# Patient Record
Sex: MALE | Race: BLACK OR AFRICAN AMERICAN | NOT HISPANIC OR LATINO | URBAN - METROPOLITAN AREA
[De-identification: names, ages, dates, MRNs, and addresses within clinical notes are randomized per-mention and may not be internally consistent; named-entity substitution may affect disease eponyms.]

---

## 2021-07-04 ENCOUNTER — INPATIENT (INPATIENT)
Facility: HOSPITAL | Age: 26
LOS: 15 days | Discharge: HOME | End: 2021-07-20
Attending: PSYCHIATRY & NEUROLOGY | Admitting: PSYCHIATRY & NEUROLOGY
Payer: MEDICAID

## 2021-07-04 VITALS
TEMPERATURE: 100 F | HEART RATE: 104 BPM | OXYGEN SATURATION: 99 % | DIASTOLIC BLOOD PRESSURE: 81 MMHG | RESPIRATION RATE: 16 BRPM | SYSTOLIC BLOOD PRESSURE: 145 MMHG

## 2021-07-04 DIAGNOSIS — F29 UNSPECIFIED PSYCHOSIS NOT DUE TO A SUBSTANCE OR KNOWN PHYSIOLOGICAL CONDITION: ICD-10-CM

## 2021-07-04 LAB
ALBUMIN SERPL ELPH-MCNC: 4.7 G/DL — SIGNIFICANT CHANGE UP (ref 3.3–5)
ALP SERPL-CCNC: 95 U/L — SIGNIFICANT CHANGE UP (ref 40–120)
ALT FLD-CCNC: 16 U/L — SIGNIFICANT CHANGE UP (ref 4–41)
ANION GAP SERPL CALC-SCNC: 14 MMOL/L — SIGNIFICANT CHANGE UP (ref 7–14)
APPEARANCE UR: CLEAR — SIGNIFICANT CHANGE UP
AST SERPL-CCNC: 17 U/L — SIGNIFICANT CHANGE UP (ref 4–40)
BASOPHILS # BLD AUTO: 0.05 K/UL — SIGNIFICANT CHANGE UP (ref 0–0.2)
BASOPHILS NFR BLD AUTO: 0.9 % — SIGNIFICANT CHANGE UP (ref 0–2)
BILIRUB SERPL-MCNC: 0.4 MG/DL — SIGNIFICANT CHANGE UP (ref 0.2–1.2)
BILIRUB UR-MCNC: NEGATIVE — SIGNIFICANT CHANGE UP
BUN SERPL-MCNC: 12 MG/DL — SIGNIFICANT CHANGE UP (ref 7–23)
CALCIUM SERPL-MCNC: 9.9 MG/DL — SIGNIFICANT CHANGE UP (ref 8.4–10.5)
CHLORIDE SERPL-SCNC: 103 MMOL/L — SIGNIFICANT CHANGE UP (ref 98–107)
CO2 SERPL-SCNC: 23 MMOL/L — SIGNIFICANT CHANGE UP (ref 22–31)
COLOR SPEC: SIGNIFICANT CHANGE UP
CREAT SERPL-MCNC: 1.22 MG/DL — SIGNIFICANT CHANGE UP (ref 0.5–1.3)
DIFF PNL FLD: NEGATIVE — SIGNIFICANT CHANGE UP
EOSINOPHIL # BLD AUTO: 0.28 K/UL — SIGNIFICANT CHANGE UP (ref 0–0.5)
EOSINOPHIL NFR BLD AUTO: 5.2 % — SIGNIFICANT CHANGE UP (ref 0–6)
GLUCOSE SERPL-MCNC: 79 MG/DL — SIGNIFICANT CHANGE UP (ref 70–99)
GLUCOSE UR QL: NEGATIVE — SIGNIFICANT CHANGE UP
HCT VFR BLD CALC: 42.5 % — SIGNIFICANT CHANGE UP (ref 39–50)
HGB BLD-MCNC: 14 G/DL — SIGNIFICANT CHANGE UP (ref 13–17)
IANC: 2.84 K/UL — SIGNIFICANT CHANGE UP (ref 1.5–8.5)
IMM GRANULOCYTES NFR BLD AUTO: 0.2 % — SIGNIFICANT CHANGE UP (ref 0–1.5)
KETONES UR-MCNC: NEGATIVE — SIGNIFICANT CHANGE UP
LEUKOCYTE ESTERASE UR-ACNC: NEGATIVE — SIGNIFICANT CHANGE UP
LYMPHOCYTES # BLD AUTO: 1.55 K/UL — SIGNIFICANT CHANGE UP (ref 1–3.3)
LYMPHOCYTES # BLD AUTO: 28.7 % — SIGNIFICANT CHANGE UP (ref 13–44)
MCHC RBC-ENTMCNC: 29.9 PG — SIGNIFICANT CHANGE UP (ref 27–34)
MCHC RBC-ENTMCNC: 32.9 GM/DL — SIGNIFICANT CHANGE UP (ref 32–36)
MCV RBC AUTO: 90.6 FL — SIGNIFICANT CHANGE UP (ref 80–100)
MONOCYTES # BLD AUTO: 0.67 K/UL — SIGNIFICANT CHANGE UP (ref 0–0.9)
MONOCYTES NFR BLD AUTO: 12.4 % — SIGNIFICANT CHANGE UP (ref 2–14)
NEUTROPHILS # BLD AUTO: 2.84 K/UL — SIGNIFICANT CHANGE UP (ref 1.8–7.4)
NEUTROPHILS NFR BLD AUTO: 52.6 % — SIGNIFICANT CHANGE UP (ref 43–77)
NITRITE UR-MCNC: NEGATIVE — SIGNIFICANT CHANGE UP
NRBC # BLD: 0 /100 WBCS — SIGNIFICANT CHANGE UP
NRBC # FLD: 0 K/UL — SIGNIFICANT CHANGE UP
PCP SPEC-MCNC: SIGNIFICANT CHANGE UP
PH UR: 6 — SIGNIFICANT CHANGE UP (ref 5–8)
PLATELET # BLD AUTO: 310 K/UL — SIGNIFICANT CHANGE UP (ref 150–400)
POTASSIUM SERPL-MCNC: 4.8 MMOL/L — SIGNIFICANT CHANGE UP (ref 3.5–5.3)
POTASSIUM SERPL-SCNC: 4.8 MMOL/L — SIGNIFICANT CHANGE UP (ref 3.5–5.3)
PROT SERPL-MCNC: 7.9 G/DL — SIGNIFICANT CHANGE UP (ref 6–8.3)
PROT UR-MCNC: NEGATIVE — SIGNIFICANT CHANGE UP
RBC # BLD: 4.69 M/UL — SIGNIFICANT CHANGE UP (ref 4.2–5.8)
RBC # FLD: 13.5 % — SIGNIFICANT CHANGE UP (ref 10.3–14.5)
SODIUM SERPL-SCNC: 140 MMOL/L — SIGNIFICANT CHANGE UP (ref 135–145)
SP GR SPEC: 1.01 — SIGNIFICANT CHANGE UP (ref 1.01–1.02)
TOXICOLOGY SCREEN, DRUGS OF ABUSE, SERUM RESULT: SIGNIFICANT CHANGE UP
TSH SERPL-MCNC: 1.01 UIU/ML — SIGNIFICANT CHANGE UP (ref 0.27–4.2)
UROBILINOGEN FLD QL: SIGNIFICANT CHANGE UP
WBC # BLD: 5.4 K/UL — SIGNIFICANT CHANGE UP (ref 3.8–10.5)
WBC # FLD AUTO: 5.4 K/UL — SIGNIFICANT CHANGE UP (ref 3.8–10.5)

## 2021-07-04 PROCEDURE — 93010 ELECTROCARDIOGRAM REPORT: CPT

## 2021-07-04 PROCEDURE — 99285 EMERGENCY DEPT VISIT HI MDM: CPT | Mod: 25

## 2021-07-04 RX ORDER — DIPHENHYDRAMINE HCL 50 MG
50 CAPSULE ORAL EVERY 6 HOURS
Refills: 0 | Status: DISCONTINUED | OUTPATIENT
Start: 2021-07-05 | End: 2021-07-20

## 2021-07-04 RX ORDER — HALOPERIDOL DECANOATE 100 MG/ML
5 INJECTION INTRAMUSCULAR EVERY 6 HOURS
Refills: 0 | Status: DISCONTINUED | OUTPATIENT
Start: 2021-07-05 | End: 2021-07-20

## 2021-07-04 RX ORDER — RISPERIDONE 4 MG/1
1 TABLET ORAL ONCE
Refills: 0 | Status: DISCONTINUED | OUTPATIENT
Start: 2021-07-04 | End: 2021-07-04

## 2021-07-04 RX ORDER — RISPERIDONE 4 MG/1
1 TABLET ORAL AT BEDTIME
Refills: 0 | Status: DISCONTINUED | OUTPATIENT
Start: 2021-07-05 | End: 2021-07-06

## 2021-07-04 RX ORDER — RISPERIDONE 4 MG/1
2 TABLET ORAL ONCE
Refills: 0 | Status: COMPLETED | OUTPATIENT
Start: 2021-07-04 | End: 2021-07-04

## 2021-07-04 RX ADMIN — RISPERIDONE 2 MILLIGRAM(S): 4 TABLET ORAL at 23:07

## 2021-07-04 NOTE — ED BEHAVIORAL HEALTH ASSESSMENT NOTE - NS ED [facility] ED COURSE FOUR POINT RESTRAINTS IN ED YN
I will STOP taking the medications listed below when I get home from the hospital:    furosemide 20 mg oral tablet  -- 1 tab(s) by mouth once a day
No

## 2021-07-04 NOTE — ED BEHAVIORAL HEALTH ASSESSMENT NOTE - OTHER PAST PSYCHIATRIC HISTORY (INCLUDE DETAILS REGARDING ONSET, COURSE OF ILLNESS, INPATIENT/OUTPATIENT TREATMENT)
Per pt, patient has history of primary psychotic disorder, schizophrenia vs schizoaffective disorder. 1st inpatient hospitalization in 2017. 2 prior inpatient hospitalizations. Last in 5/12-25/2021 at [facility],

## 2021-07-04 NOTE — ED BEHAVIORAL HEALTH NOTE - BEHAVIORAL HEALTH NOTE
COVID Exposure Screen- Patient    1. *Have you had a COVID-19 test in the last 90 days? ( ) Yes (X) No ( ) Unknown- Reason: _____    IF YES PROCEED TO QUESTION #2. IF NO OR UNKNOWN, PLEASE SKIP TO QUESTION #3.    2. Date of test(s) and result(s): ________    3. *Have you tested positive for COVID-19 antibodies? ( ) Yes (X) No ( ) Unknown- Reason: _____    IF YES PROCEED TO QUESTION #4. IF NO or UNKNOWN, PLEASE SKIP TO QUESTION #5.    4. Date of positive antibody test: ________    5. *Have you received 2 doses of the COVID-19 vaccine? ( X) Yes ( ) No ( ) Unknown- Reason: _____    IF YES PROCEED TO QUESTION #6. IF NO or UNKNOWN, PLEASE SKIP TO QUESTION #7.    6. Date of second dose: 6/7/21    7. *In the past 10 days, have you been around anyone with a positive COVID-19 test?* ( ) Yes ( ) No (X ) Unknown- Reason: ____    IF YES PROCEED TO QUESTION #8. IF NO or UNKNOWN, PLEASE SKIP TO QUESTION #13.    8. Were you within 6 feet of them for at least 15 minutes? ( ) Yes ( ) No ( ) Unknown- Reason: _____    9. Have you provided care for them? ( ) Yes ( ) No ( ) Unknown- Reason: ______    10. Have you had direct physical contact with them (touched, hugged, or kissed them)? ( ) Yes ( ) No ( ) Unknown- Reason: _____    11. Have you shared eating or drinking utensils with them? ( ) Yes ( ) No ( ) Unknown- Reason: ____    12. Have they sneezed, coughed, or somehow gotten respiratory droplets on you? ( ) Yes ( ) No ( ) Unknown- Reason: ______    13. *Have you been out of [place] within the past 10 days?* ( ) Yes (X ) No ( ) Unknown- Reason: _____    IF YES PLEASE ANSWER THE FOLLOWING QUESTIONS:    14. Which state/country have you been to? ______    15. Were you there over 24 hours? ( ) Yes ( ) No ( ) Unknown- Reason: ______    16. Date of return to [facility]: ______

## 2021-07-04 NOTE — ED BEHAVIORAL HEALTH ASSESSMENT NOTE - VIOLENCE PROTECTIVE FACTORS:
Residential stability/Employment stability/Insight into violence risk and need for management/treatment

## 2021-07-04 NOTE — ED BEHAVIORAL HEALTH ASSESSMENT NOTE - RISK ASSESSMENT
High Acute Suicide Risk The patient is at chronically elevated risk of self harm and suicide. Risk factors include recent passive SI, psychosis, history of treatment nonadherence, hx of inpatient hospitalizations. Protective factors include no active SI/I/P on unit, stable housing, supportive housing. Overall, the patient is an acute and imminent risk of harm and meets criteria for psychiatric hospitalization for safety and stabilization. The patient is at chronically elevated risk of self harm and suicide. Risk factors include recent passive SI, psychosis, history of treatment nonadherence, hx of inpatient hospitalizations. Protective factors include no active SI/I/P on unit pt help seeking,, stable housing, supportive housing. Overall, the patient is an acute and imminent risk of harm and meets criteria for psychiatric hospitalization for safety and stabilization. Risk will be mitigated by admission to psychiatric hospital.
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
## 2021-07-04 NOTE — ED BEHAVIORAL HEALTH ASSESSMENT NOTE - DESCRIPTION
Calm and cooperative in ED. Calm and cooperative in ED.    Vital Signs Last 24 Hrs  T(C): 37.5 (04 Jul 2021 19:22), Max: 37.5 (04 Jul 2021 19:22)  T(F): 99.5 (04 Jul 2021 19:22), Max: 99.5 (04 Jul 2021 19:22)  HR: 104 (04 Jul 2021 19:22) (104 - 104)  BP: 145/81 (04 Jul 2021 19:22) (145/81 - 145/81)  BP(mean): --  RR: 16 (04 Jul 2021 19:22) (16 - 16)  SpO2: 99% (04 Jul 2021 19:22) (99% - 99%) Denies medical history, NKDA Lives w/ parents in [place]. Working as a temp.

## 2021-07-04 NOTE — ED BEHAVIORAL HEALTH ASSESSMENT NOTE - PSYCHIATRIC ISSUES AND PLAN (INCLUDE STANDING AND PRN MEDICATION)
PRNs for agitation: Haldol 5 mg PO/IM q6h, Ativan 2 mg PO/IM q6h, Benadryl 50 mg PO/IM q6h Start Risperdal 2 mg PO qhs for psychosis and Ativan 1 mg qhs PO for insomnia. PRNs for agitation: Haldol 5 mg PO/IM q6h, Ativan 2 mg PO/IM q6h, Benadryl 50 mg PO/IM q6h Start Risperdal 1 mg PO qhs for psychosis and Ativan 1 mg qhs PO for insomnia. PRNs for agitation: Haldol 5 mg PO/IM q6h, Ativan 2 mg PO/IM q6h, Benadryl 50 mg PO/IM q6h Pt received Risperdal 2mg in the ED, primary team to determine FORD dosage which was due 2 weeks ago, would be beneficial to discuss alternative options with patient due to concerns re: weight gain, PRNS: Ativan 1 mg qhs PO for insomnia. PRNs for agitation: Haldol 5 mg PO/IM q6h, Ativan 2 mg PO/IM q6h, Benadryl 50 mg PO/IM q6h

## 2021-07-04 NOTE — ED PROVIDER NOTE - PROGRESS NOTE DETAILS
Chandrakant ERAZO: Pt signed out to me.  Labs reviewed, covid pcr neg.  Pt is medically clear for psychiatric dispo.  Will be admitted to [facility].

## 2021-07-04 NOTE — ED BEHAVIORAL HEALTH ASSESSMENT NOTE - DIFFERENTIAL
Primary psychotic disorder, schizophrenia vs schizoaffective disorder vs substance induced psychosis
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
## 2021-07-04 NOTE — ED PROVIDER NOTE - OBJECTIVE STATEMENT
26 y/o  M hx Schizophrenia   BIBA secondary to  bizarre behaviour and suicidal ideations .  Admits to death as his final answer to getting to sleep and peace.    Appears paranoid and internally preoccupied. Denies HI/AH/VH.  No evidence of physical injuries, broken  skin or deformities.  Denies falling, punching or kicking any objects. Denies pain, SOB, fever , chills, chest/ abdominal discomfort.  Denies recent use of alcohol or other   illicit drugs. Admits to medication non compliance.

## 2021-07-04 NOTE — ED ADULT NURSE NOTE - NS ED NURSE DISCH DISPOSITION
[facility]    [address] [address]    Phone:  [phone]                                       Lita Ocasio   MRN: [id number]    Department:  [facility]   Date of Visit:  5/26/2018           After Visit Summary Signature Page     I have received my discharge instructions, and my questions have been answered. I have discussed any challenges I see with this plan with the nurse or doctor.    ..........................................................................................................................................  Patient/Patient Representative Signature      ..........................................................................................................................................  Patient Representative Print Name and Relationship to Patient    ..................................................               ................................................  Date                                            Time    ..........................................................................................................................................  Reviewed by Signature/Title    ...................................................              ..............................................  Date                                                            Time           Admitted

## 2021-07-04 NOTE — ED PROVIDER NOTE - PATIENT PORTAL LINK FT
You can access the FollowMyHealth Patient Portal offered by [facility] by registering at the following website: [email or web address]. By joining [facility]’s FollowMyHealth portal, you will also be able to view your health information using other applications (apps) compatible with our system.

## 2021-07-04 NOTE — ED ADULT NURSE NOTE - OBJECTIVE STATEMENT
break RN: pt received in  area. c/o inability to sleep. reports has intermittent auditory hallucinations but denies at present. denies any si at present. states "sometimes I just wanna die cause I cant sleep." denies hi. denies medical complaints. reports drank a 40 ounce beer today. reports rarely drinks and does not use drugs. denies any other substance use. calm and cooperative. last invega injection was at the end of may.

## 2021-07-04 NOTE — ED BEHAVIORAL HEALTH ASSESSMENT NOTE - CURRENT MEDICATION
Reportedly last received Invega FORD on 5/25/21 Reportedly last received Invega FORD on 5/25/21 at [facility].
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
## 2021-07-04 NOTE — ED PROVIDER NOTE - CLINICAL SUMMARY MEDICAL DECISION MAKING FREE TEXT BOX
26 y/o  M hx Schizophrenia    Labs, Urine Tox/UA, EKG  Medical evaluation performed. There is no clinical evidence of intoxication or any acute medical problem requiring immediate intervention. Patient is awaiting psychiatric consultation. Final disposition will be determined by psychiatrist.

## 2021-07-04 NOTE — ED BEHAVIORAL HEALTH ASSESSMENT NOTE - PAST PSYCHOTROPIC MEDICATION
Prior trial of Risperdal. No information found on I-STOP. Prior trial of Risperdal. Unsure of other medication trials. No information found on I-STOP.

## 2021-07-04 NOTE — ED BEHAVIORAL HEALTH ASSESSMENT NOTE - HPI (INCLUDE ILLNESS QUALITY, SEVERITY, DURATION, TIMING, CONTEXT, MODIFYING FACTORS, ASSOCIATED SIGNS AND SYMPTOMS)
24 y/o man, domiciled w/ parents and older sister in [facility], working a temp job in , single, no-dependants, no pertinent PMH, NKDA, denies any SA or self-injurious behavior, history of aggression toward father, 2 prior inpatient psych admissions, 1st in 2017, last in 5/12-25/2021 at [facility], reports active cannabis use (CBD gummies), last had Invega FORD on 5/25/2021. 26 y/o male, domiciled w/ parents and older sister in [facility], working a temp job in , single, no-dependants, reported history of schizophrenia vs schizoaffective disorder bipolar type, no pertinent PMH, NKDA, denies any SA or self-injurious behavior, history of aggression toward father, 2 prior inpatient psych admissions, 1st in 2017, last in 5/12-25/2021 at [facility], reports active cannabis use (CBD gummies), reportedly last had Invega FORD on 5/25/2021, who was brought to the ED via EMS activated by patient who called 911 due to suicidal ideation in setting of missing dose of FORD.     On interview, pt was calm and cooperative though overtly thought disordered. Pt reports that he has been having difficulty sleeping, his mind is "racing," he was Tweeting profusely, he has been feeling depressed because he has not found his "Gladys" (he has been single for 5 years). He states that he wants to find a "wife" that cooks and wishes that he could be sexually active (but reports that it is unlikely to find someone who "understands" him). He states that he called 911 because he was thinking about "ending it all" and was thinking about ending his life. Reports thoughts of wishing he was dead currently, but denies intent or plan. He denies any prior history of SA. He believes it is possible that he has bipolar disorder and that he previously was on Risperdal but has been on Invega for the last 2 years, which he does not believe he needs. He reports history of AVH which he perceives as "loud music." He reports using cannabis products regularly, which he states that makes the music "louder" stated, "weed is loud." Reports taking edible today and drinking 1 beer earlier today, denies any other substance use. 26 y/o male, domiciled w/ parents and older sister in [facility], working a temp job in , single, no-dependants, reported history of schizophrenia vs schizoaffective disorder bipolar type, no pertinent PMH, NKDA, denies any SA or self-injurious behavior, history of aggression toward father, 2 prior inpatient psych admissions, 1st in 2017, last in 5/12-25/2021 at [facility], reports active cannabis use (CBD gummies), reportedly last had Invega FORD on 5/25/2021, who was brought to the ED via EMS activated by patient who called 911 due to suicidal ideation in setting of missing dose of FORD.     On interview, pt was calm and cooperative though overtly thought disordered. Pt reports that he has been having difficulty sleeping, his mind is "racing," he was Tweeting profusely, he has been feeling depressed because he has not found his "Gladys" (he has been single for 5 years). He states that he wants to find a "wife" that cooks and wishes that he could be sexually active (but reports that it is unlikely to find someone who "understands" him). He states that he called 911 because he was thinking about "ending it all" and was thinking about ending his life. Reports thoughts of wishing he was dead currently, but denies intent or plan. He denies any prior history of SA. He believes it is possible that he has bipolar disorder and that he previously was on Risperdal but has been on Invega for the last 2 years, which he does not believe he needs. He reports history of AVH which he perceives as "loud music." He reports using cannabis products regularly, which he states that makes the music "louder" stated, "weed is loud." Reports taking edible today and drinking 1 beer earlier today, denies any other substance use.    Attempted to call patient, left a secure voicemail. 24 y/o male, domiciled w/ parents and sister in [facility], previously working a temp job in , single, no dependants, reported history of schizophrenia vs schizoaffective disorder bipolar type, no pertinent PMH, tested COVID-19 + in May 2021 (completed Moderna series last month) NKDA, denies any SA or self-injurious behavior, family denies history of violence or aggression, 2 prior inpatient psych admissions, 1st in 2017, last in 5/12-25/2021 at [facility], in outpatient psychiatric tx at [facility], reports active cannabis use (CBD gummies), reportedly last had Invega FORD on 5/25/2021, who was brought to the ED via EMS activated by patient who called 911 due to suicidal ideation in setting of missing dose of FORD.     On interview, pt was calm and cooperative though overtly thought disordered. Pt reports that he has been having difficulty sleeping, his mind is "racing," he was Tweeting profusely, he has been feeling depressed because he has not found his "Gladys" (he has been single for 5 years). He states that he wants to find a "wife" that cooks and wishes that he could be sexually active (but reports that it is unlikely to find someone who "understands" him). He states that he called 911 because he was thinking about "ending it all" and was thinking about ending his life. Reports thoughts of wishing he was dead currently, but denies intent or plan. He denies any prior history of SA. He believes it is possible that he has bipolar disorder and that he previously was on Risperdal but has been on Invega for the last 2 years, which he does not believe he needs. He reports history of AVH which he perceives as "loud music." He reports using cannabis products regularly, which he states that makes the music "louder" stated, "weed is loud." Reports taking edible today and drinking 1 beer earlier today, denies any other substance use.    Collateral information obtained from pt's mother Ronna Johnson ([phone]). Report that pt has a history of bipolar disorder and schizophrenia and receives his outpt psychiatric treatment at [facility] but recently changed psychiatrists. He had gone to [place] today and family found it strange he told them he was going to go to [place] later to watch the 4th of July fireworks. They were unaware pt was in the ED, but noted that pt calls the ED when he is "distressed." Pt has a history of medication nonadherence, he had been off of his FORD since Sept 2020 until May 2021. Pt had been doing "okay" since his last inpatient psychiatric hospitalization, had lost weight and had been exercising. Report pt does not have a history of SI or SA, no history of aggression or violence. Updated family of treatment plan. 26 y/o male, domiciled w/ parents and sister in [facility], previously working a temp job in , single, no dependants, reported history of schizophrenia vs schizoaffective disorder bipolar type, no pertinent PMH, tested COVID-19 + in May 2021 (completed Moderna series last month) NKDA, denies any SA or self-injurious behavior, family denies history of violence or aggression, 2 prior inpatient psych admissions, 1st in 2017, last in 5/12-25/2021 at [facility], in outpatient psychiatric tx at [facility], denies substance use (but reports eating CBD gummies)  reportedly last had Invega FORD on 5/25/2021, who was brought to the ED via EMS activated by patient who called 911 due to suicidal ideation in setting of missing dose of FORD.     On interview, pt was calm and cooperative though overtly thought disordered. Pt reports that he has been having difficulty sleeping, his mind is "racing," he was Tweeting profusely, he has been feeling depressed because he has not found his "Gladys" (he has been single for 5 years). He states that he is "paleosexual" which he defines as an intellectual sexual person. He states that he wants to find a "wife" that cooks and wishes that he could be sexually active (but reports that it is unlikely to find someone who "understands" him). He states that he called 911 because he was thinking about "ending it all" and was thinking about ending his life. Reports thoughts of wishing he was dead currently, but denies intent or plan (he identified suicidal ideation as "feeling light blue." He denies any prior history of SA. He believes it is possible that he has bipolar disorder and that he previously was on Risperdal but has been on Invega for the last 2 years, which he does not believe he needs. He reports history of AVH which he perceives as "loud music" and "notes." He states that this is keeping him up at night.  He reports using CBD products regularly, which he states that makes the music "louder" stated, "weed is loud." He denies using substances (other than 1 beer earlier today) but states that he feels "[place]" from 2nd hand smoke is causing his voices. Of note, patient states that he at times stops taking his Invega because he is nervous about weight gain. (He previously weight 370 pounds and lost over 100 pounds. HE stated that he was able to lose weight on both Invega and off of Invega, but that he stops this medication due to concern for weight gain. He states that he would be open to trying new medication or changing the formulation of Invega to Trinza, though he feels that his Invega at times doesn't "hold" him, though he missed his dose 2 weeks ago.     Collateral information obtained from pt's mother Ronna Johnson ([phone]). Report that pt has a history of bipolar disorder and schizophrenia and receives his outpt psychiatric treatment at [facility] but recently changed psychiatrists. He had gone to [place] today and family found it strange he told them he was going to go to [place] later to watch the 4th of July [employer]. They were unaware pt was in the ED, but noted that pt calls the ED when he is "distressed." Pt has a history of medication nonadherence, he had been off of his FORD since Sept 2020 until May 2021. Pt had been doing "okay" since his last inpatient psychiatric hospitalization, had lost weight and had been exercising. Report pt does not have a history of SI or SA, no history of aggression or violence. Updated family of treatment plan.

## 2021-07-04 NOTE — ED ADULT TRIAGE NOTE - CHIEF COMPLAINT QUOTE
Pt PMH of schizophrenia, non compliant with his medications x1 week c/o hallucinations and suicidal ideation without plan. Denies ETOH/ drug use/ HI.

## 2021-07-04 NOTE — ED ADULT NURSE NOTE - NSIMPLEMENTINTERV_GEN_ALL_ED
Implemented All Universal Safety Interventions:  [place] to call system. Call bell, personal items and telephone within reach. Instruct patient to call for assistance. Room bathroom lighting operational. Non-slip footwear when patient is off stretcher. Physically safe environment: no spills, clutter or unnecessary equipment. Stretcher in lowest position, wheels locked, appropriate side rails in place.

## 2021-07-04 NOTE — ED BEHAVIORAL HEALTH ASSESSMENT NOTE - CASE SUMMARY
I have evaluated the patient and I discussed the case with Dr. Larkin. I agree with her assessment and plan. In brief, the patient is a 25 year old man with history of schizophrenia and past psychiatric hospitalizations. He is on Invega Sustenna, but missed his most recent dose (due end of June). He reports distressing AH (or music) that is interfering with his sleep and his functioning. He also expresses intermittent SI, without intent or plan.  The patient is acutely psychotic with AH, he appears distracted and likely is responding to internal stimuli which is also interfering with his sleep and functioning. He would benefit from inpatient hospitalization for safety and stabilization given suicidal ideation/psychosis and will be admitted on 9.39. Family and patient aware and agreeable with plan. Patient will go to [facility] once medically cleared.

## 2021-07-05 DIAGNOSIS — F29 UNSPECIFIED PSYCHOSIS NOT DUE TO A SUBSTANCE OR KNOWN PHYSIOLOGICAL CONDITION: ICD-10-CM

## 2021-07-05 LAB
COVID-19 SPIKE DOMAIN AB INTERP: POSITIVE
COVID-19 SPIKE DOMAIN ANTIBODY RESULT: >250 U/ML — HIGH
SARS-COV-2 IGG+IGM SERPL QL IA: >250 U/ML — HIGH
SARS-COV-2 IGG+IGM SERPL QL IA: POSITIVE
SARS-COV-2 RNA SPEC QL NAA+PROBE: SIGNIFICANT CHANGE UP

## 2021-07-05 PROCEDURE — 99222 1ST HOSP IP/OBS MODERATE 55: CPT

## 2021-07-05 RX ORDER — TRAZODONE HCL 50 MG
50 TABLET ORAL AT BEDTIME
Refills: 0 | Status: DISCONTINUED | OUTPATIENT
Start: 2021-07-05 | End: 2021-07-06

## 2021-07-05 RX ORDER — HYDROXYZINE HCL 10 MG
50 TABLET ORAL EVERY 6 HOURS
Refills: 0 | Status: DISCONTINUED | OUTPATIENT
Start: 2021-07-05 | End: 2021-07-08

## 2021-07-05 RX ADMIN — RISPERIDONE 1 MILLIGRAM(S): 4 TABLET ORAL at 21:08

## 2021-07-05 RX ADMIN — Medication 50 MILLIGRAM(S): at 04:03

## 2021-07-05 RX ADMIN — HALOPERIDOL DECANOATE 5 MILLIGRAM(S): 100 INJECTION INTRAMUSCULAR at 21:08

## 2021-07-05 RX ADMIN — Medication 50 MILLIGRAM(S): at 21:08

## 2021-07-05 RX ADMIN — HALOPERIDOL DECANOATE 5 MILLIGRAM(S): 100 INJECTION INTRAMUSCULAR at 04:03

## 2021-07-05 RX ADMIN — Medication 1 MILLIGRAM(S): at 21:08

## 2021-07-05 RX ADMIN — Medication 2 MILLIGRAM(S): at 04:02

## 2021-07-05 NOTE — BH INPATIENT PSYCHIATRY ASSESSMENT NOTE - NSBHCHARTREVIEWVS_PSY_A_CORE FT
Vital Signs Last 24 Hrs  T(C): 36.4 (05 Jul 2021 06:21), Max: 37.5 (04 Jul 2021 19:22)  T(F): 97.6 (05 Jul 2021 06:21), Max: 99.5 (04 Jul 2021 19:22)  HR: 88 (05 Jul 2021 03:40) (79 - 104)  BP: 126/70 (05 Jul 2021 01:53) (126/70 - 145/81)  BP(mean): --  RR: 16 (05 Jul 2021 03:40) (16 - 16)  SpO2: 100% (05 Jul 2021 01:53) (99% - 100%)

## 2021-07-05 NOTE — BH INPATIENT PSYCHIATRY ASSESSMENT NOTE - NSBHASSESSSUMMFT_PSY_ALL_CORE
Plan:   >Legal: 9.39  >Obs: Routine, no current SI. no need for CO, patient not expected to pose risk to self or others in controlled inpatient setting  >Psychiatric Meds: Restart outpatient medication regimen: Risperdal 1mg, titrate as tolerated. Observe for tolerability and efficacy. Patient had been poorly adherent prior to admission.    PRN medications:  Ativan 2mg oral Q6HR PRN for agitation and anxiety.  Haldol 5mg oral Q6HR PRN for agitation.   Benadryl 50mg oral Q6HR PRN for agitation.   Vistaril 50mg oral Q6HR PRN for anxiety.  Desyrel 50mg oral QHS PRN for insomnia.     >Labs: Admission labs reviewed, no acute findings. labs pending for tomorrow: A1c and lipid level. Hold antipsychotics if QTc >500  >Medical:   No acute concerns. No consultations needed at this time. No indication for CIWA. Patient with consistently stable VS, no visible physical symptoms of  withdrawal. During the course of treatment, will collaborate with medical team to manage medical issues.  >Diet: Regular  >Social: milieu/structured therapy  >Treatment Interventions: Groups and Individual Therapy/CBT, Motivational counseling for substance abuse related issues.   >Dispo: Collateral and dispo planning pending further symptom and medication optimization

## 2021-07-05 NOTE — BH INPATIENT PSYCHIATRY ASSESSMENT NOTE - HPI (INCLUDE ILLNESS QUALITY, SEVERITY, DURATION, TIMING, CONTEXT, MODIFYING FACTORS, ASSOCIATED SIGNS AND SYMPTOMS)
26 y/o male, domiciled w/ parents and sister in [facility], previously working a temp job in , single, no dependants, reported history of schizophrenia vs schizoaffective disorder bipolar type, no pertinent PMH, tested COVID-19 + in May 2021 (completed Moderna series last month) NKDA, denies any SA or self-injurious behavior, family denies history of violence or aggression, 2 prior inpatient psych admissions, 1st in 2017, last in 5/12-25/2021 at [facility], in outpatient psychiatric tx at [facility], denies substance use (but reports eating CBD gummies)  reportedly last had Invega FORD on 5/25/2021, who was brought to the ED via EMS activated by patient who called 911 due to suicidal ideation in setting of missing dose of FORD.   On interview, pt was calm and cooperative though overtly thought disordered. Pt reports that he has been having difficulty sleeping, his mind is "racing," he was Tweeting profusely, he has been feeling depressed because he has not found his "Gladys" (he has been single for 5 years). He states that he is "paleosexual" which he defines as an intellectual sexual person. He states that he wants to find a "wife" that cooks and wishes that he could be sexually active (but reports that it is unlikely to find someone who "understands" him). He states that he called 911 because he was thinking about "ending it all" and was thinking about ending his life. Reports thoughts of wishing he was dead currently, but denies intent or plan (he identified suicidal ideation as "feeling light blue." He denies any prior history of SA. He believes it is possible that he has bipolar disorder and that he previously was on Risperdal but has been on Invega for the last 2 years, which he does not believe he needs. He reports history of AVH which he perceives as "loud music" and "notes." He states that this is keeping him up at night.  He reports using CBD products regularly, which he states that makes the music "louder" stated, "weed is loud." He denies using substances (other than 1 beer earlier today) but states that he feels "[place]" from 2nd hand smoke is causing his voices. Of note, patient states that he at times stops taking his Invega because he is nervous about weight gain. (He previously weight 370 pounds and lost over 100 pounds. HE stated that he was able to lose weight on both Invega and off of Invega, but that he stops this medication due to concern for weight gain. He states that he would be open to trying new medication or changing the formulation of Invega to Trinza, though he feels that his Invega at times doesn't "hold" him, though he missed his dose 2 weeks ago.   Collateral information obtained from pt's mother Ronna Johnson ([phone]). Report that pt has a history of bipolar disorder and schizophrenia and receives his outpt psychiatric treatment at [facility] but recently changed psychiatrists. He had gone to [place] today and family found it strange he told them he was going to go to [place] later to watch the [employer]. They were unaware pt was in the ED, but noted that pt calls the ED when he is "distressed." Pt has a history of medication nonadherence, he had been off of his FORD since Sept 2020 until May 2021. Pt had been doing "okay" since his last inpatient psychiatric hospitalization, had lost weight and had been exercising. Report pt does not have a history of SI or SA, no history of aggression or violence. Updated family of treatment plan.   Patient was seen and evaluated, chart reviewed. Case discussed with nursing team.  On service for this 25 year old  male with PPH of Schizoaffective Disorder Bipolar type.  Patient is hospitalized with a primary problem of worsening mood, with suicidal ideations. Patient admitted to [facility] on a 9.39 legal status. I have reviewed the initial psychiatric assessment in the electronic medical record, including the history of present illness, past psychiatric history, family/social history (no pertinent changes), and exam, and have confirmed the salient findings dated 21.  As per chart review, transferring records indicated the followin y/o male, domiciled w/ parents and sister in [facility], previously working a temp job in , single, no dependants, reported history of schizophrenia vs schizoaffective disorder bipolar type, no pertinent PMH, tested COVID-19 + in May 2021 (completed Moderna series last month) NKDA, denies any SA or self-injurious behavior, family denies history of violence or aggression, 2 prior inpatient psych admissions, 1st in , last in - at [facility], in outpatient psychiatric tx at [facility], denies substance use (but reports eating CBD gummies)  reportedly last had Invega FORD on 2021, who was brought to the ED via EMS activated by patient who called 911 due to suicidal ideation in setting of missing dose of FORD.   On interview, pt was calm and cooperative though overtly thought disordered. Pt reports that he has been having difficulty sleeping, his mind is "racing," he was Tweeting profusely, he has been feeling depressed because he has not found his "Gladys" (he has been single for 5 years). He states that he is "paleosexual" which he defines as an intellectual sexual person. He states that he wants to find a "wife" that cooks and wishes that he could be sexually active (but reports that it is unlikely to find someone who "understands" him). He states that he called 911 because he was thinking about "ending it all" and was thinking about ending his life. Reports thoughts of wishing he was dead currently, but denies intent or plan (he identified suicidal ideation as "feeling light blue." He denies any prior history of SA. He believes it is possible that he has bipolar disorder and that he previously was on Risperdal but has been on Invega for the last 2 years, which he does not believe he needs. He reports history of AVH which he perceives as "loud music" and "notes." He states that this is keeping him up at night.  He reports using CBD products regularly, which he states that makes the music "louder" stated, "weed is loud." He denies using substances (other than 1 beer earlier today) but states that he feels "[place]" from 2nd hand smoke is causing his voices. Of note, patient states that he at times stops taking his Invega because he is nervous about weight gain. (He previously weight 370 pounds and lost over 100 pounds. HE stated that he was able to lose weight on both Invega and off of Invega, but that he stops this medication due to concern for weight gain. He states that he would be open to trying new medication or changing the formulation of Invega to Trinza, though he feels that his Invega at times doesn't "hold" him, though he missed his dose 2 weeks ago.   Collateral information obtained from pt's mother Ronna Johnson ([phone]). Report that pt has a history of bipolar disorder and schizophrenia and receives his outpt psychiatric treatment at [facility] but recently changed psychiatrists. He had gone to [place] today and family found it strange he told them he was going to go to [place] later to watch the  [employer]. They were unaware pt was in the ED, but noted that pt calls the ED when he is "distressed." Pt has a history of medication nonadherence, he had been off of his FORD since 2020 until May 2021. Pt had been doing "okay" since his last inpatient psychiatric hospitalization, had lost weight and had been exercising. Report pt does not have a history of SI or SA, no history of aggression or violence. Updated family of treatment plan.    On unit:  Information Received From: Chart review and patient interview       Patient was seen and evaluated, chart reviewed. Case discussed with nursing team.  On service for this 25 year old  male with PPH of Schizoaffective Disorder Bipolar type.  Patient is hospitalized with a primary problem of worsening mood, with suicidal ideations. Patient admitted to [facility] on a 9.39 legal status. I have reviewed the initial psychiatric assessment in the electronic medical record, including the history of present illness, past psychiatric history, family/social history (no pertinent changes), and exam, and have confirmed the salient findings dated 21.  As per chart review, transferring records indicated the followin y/o male, domiciled w/ parents and sister in [facility], previously working a temp job in , single, no dependants, reported history of schizophrenia vs schizoaffective disorder bipolar type, no pertinent PMH, tested COVID-19 + in May 2021 (completed Moderna series last month) NKDA, denies any SA or self-injurious behavior, family denies history of violence or aggression, 2 prior inpatient psych admissions, 1st in , last in - at [facility], in outpatient psychiatric tx at [facility], denies substance use (but reports eating CBD gummies)  reportedly last had Invega FORD on 2021, who was brought to the ED via EMS activated by patient who called 911 due to suicidal ideation in setting of missing dose of FORD.   On interview, pt was calm and cooperative though overtly thought disordered. Pt reports that he has been having difficulty sleeping, his mind is "racing," he was Tweeting profusely, he has been feeling depressed because he has not found his "Gladys" (he has been single for 5 years). He states that he is "paleosexual" which he defines as an intellectual sexual person. He states that he wants to find a "wife" that cooks and wishes that he could be sexually active (but reports that it is unlikely to find someone who "understands" him). He states that he called 911 because he was thinking about "ending it all" and was thinking about ending his life. Reports thoughts of wishing he was dead currently, but denies intent or plan (he identified suicidal ideation as "feeling light blue." He denies any prior history of SA. He believes it is possible that he has bipolar disorder and that he previously was on Risperdal but has been on Invega for the last 2 years, which he does not believe he needs. He reports history of AVH which he perceives as "loud music" and "notes." He states that this is keeping him up at night.  He reports using CBD products regularly, which he states that makes the music "louder" stated, "weed is loud." He denies using substances (other than 1 beer earlier today) but states that he feels "[place]" from 2nd hand smoke is causing his voices. Of note, patient states that he at times stops taking his Invega because he is nervous about weight gain. (He previously weight 370 pounds and lost over 100 pounds. HE stated that he was able to lose weight on both Invega and off of Invega, but that he stops this medication due to concern for weight gain. He states that he would be open to trying new medication or changing the formulation of Invega to Trinza, though he feels that his Invega at times doesn't "hold" him, though he missed his dose 2 weeks ago.   Collateral information obtained from pt's mother Ronna Johnson ([phone]). Report that pt has a history of bipolar disorder and schizophrenia and receives his outpt psychiatric treatment at [facility] but recently changed psychiatrists. He had gone to [place] today and family found it strange he told them he was going to go to [place] later to watch the  [email or web address]. They were unaware pt was in the ED, but noted that pt calls the ED when he is "distressed." Pt has a history of medication nonadherence, he had been off of his FORD since 2020 until May 2021. Pt had been doing "okay" since his last inpatient psychiatric hospitalization, had lost weight and had been exercising. Report pt does not have a history of SI or SA, no history of aggression or violence. Updated family of treatment plan.    On unit:  Information Received From: Chart review and patient interview    Patient is followed up for depression and psychosis.  Chart, medications and labs reviewed.  Patient is discussed with nursing staff. No significant overnight issues.      Patient is observed in his room but agrees to interview.  When questioned about his mood patient reports feeling “ I spent most of the day diving deeply into my mood.”  Patient admits to low mood. Denies SI, anxiety or jasmin.  Reports prior to admission he was feeling very depressed and suicidal , SI started 2 days ago reports “I started feeling more Brian.” Reports poor sleep for 2 days,  racing thoughts, more psychotic symptoms. Endorses +AH, IOR “the music is talking to me an then I do what it says” unable to elaborate what sort of things. Regarding AH pt reports hearing the same things over and over, he describes it as “interlooping thoughts, thoughts on top of thoughts on top of thoughts.”    Patient presents intermally preoccupied, disorganized with ongoing perceptual disturbances. Denies drug use states he is allergic to cannabis, reports occasional alcohol, last drank two days ago, reports 30 ounces of liquor. Denies any w/d symptoms, no hx of sx w/d, u-tox negative. Reports medication trial with Invega FORD, “it didn’t work for me” and Risperdal.   No mention of sleep disturbances or appetite concerns om the unit.  Remains compliant with standing medications.  The need for medication compliance is emphasized.  Self- care remains fair.  No acute medical concerns, denies any pain. VSS. Continue to provide therapeutic support.

## 2021-07-05 NOTE — BH INPATIENT PSYCHIATRY ASSESSMENT NOTE - OTHER PAST PSYCHIATRIC HISTORY (INCLUDE DETAILS REGARDING ONSET, COURSE OF ILLNESS, INPATIENT/OUTPATIENT TREATMENT)
Per pt, patient has history of primary psychotic disorder, schizophrenia vs schizoaffective disorder. 1st inpatient hospitalization in 2017. 2 prior inpatient hospitalizations. Last in 5/12-25/2021 at [facility],

## 2021-07-05 NOTE — BH INPATIENT PSYCHIATRY ASSESSMENT NOTE - RISK ASSESSMENT
The patient is at chronically elevated risk of self harm and suicide. Risk factors include recent passive SI, psychosis, history of treatment nonadherence, hx of inpatient hospitalizations. Protective factors include no active SI/I/P on unit pt help seeking,, stable housing, supportive housing. Overall, the patient is an acute and imminent risk of harm and meets criteria for psychiatric hospitalization for safety and stabilization. Risk will be mitigated by admission to psychiatric hospital.

## 2021-07-06 LAB
A1C WITH ESTIMATED AVERAGE GLUCOSE RESULT: 5 % — SIGNIFICANT CHANGE UP (ref 4–5.6)
CHOLEST SERPL-MCNC: 156 MG/DL — SIGNIFICANT CHANGE UP
ESTIMATED AVERAGE GLUCOSE: 97 — SIGNIFICANT CHANGE UP
HDLC SERPL-MCNC: 73 MG/DL — SIGNIFICANT CHANGE UP
LIPID PNL WITH DIRECT LDL SERPL: 70 MG/DL — SIGNIFICANT CHANGE UP
NON HDL CHOLESTEROL: 83 MG/DL — SIGNIFICANT CHANGE UP
TRIGL SERPL-MCNC: 66 MG/DL — SIGNIFICANT CHANGE UP
TSH SERPL-MCNC: 0.74 UIU/ML — SIGNIFICANT CHANGE UP (ref 0.27–4.2)

## 2021-07-06 RX ORDER — PALIPERIDONE 1.5 MG/1
156 TABLET, EXTENDED RELEASE ORAL ONCE
Refills: 0 | Status: COMPLETED | OUTPATIENT
Start: 2021-07-07 | End: 2021-07-07

## 2021-07-06 RX ORDER — RISPERIDONE 4 MG/1
2 TABLET ORAL AT BEDTIME
Refills: 0 | Status: DISCONTINUED | OUTPATIENT
Start: 2021-07-06 | End: 2021-07-08

## 2021-07-06 RX ORDER — TRAZODONE HCL 50 MG
50 TABLET ORAL AT BEDTIME
Refills: 0 | Status: DISCONTINUED | OUTPATIENT
Start: 2021-07-06 | End: 2021-07-07

## 2021-07-06 RX ADMIN — RISPERIDONE 2 MILLIGRAM(S): 4 TABLET ORAL at 20:41

## 2021-07-06 RX ADMIN — Medication 50 MILLIGRAM(S): at 20:41

## 2021-07-06 RX ADMIN — Medication 1 MILLIGRAM(S): at 20:41

## 2021-07-06 NOTE — PSYCHIATRIC REHAB INITIAL EVALUATION - NSBHPRRECOMMEND_PSY_ALL_CORE
Writer and  met with patient to introduce patient to  6.  Patient is a 25 year old, domiciled with parents and sister, unemployed,  male, admitted to [facility] on 7/5/2021 by EMS activated by patient who called 911 due to suicidal ideation in the context of missing a dose of his FORD.  Patient denied active SI/HI as well as AH/VH during initial psych rehab assessment.  Patient has previous history of 2 prior inpatient psychiatric admissions, 1st in 2017 and last in May 2021 at [facility].  Patient is currently in outpatient at [facility]. Patient denied substance use, reporting he drank one large bottle of beer prior to admission.  As per patient's chart, patient uses CBD gummies.  Patient was calm and cooperative during assessment, reporting he often has "lucid" dreams which make it difficult for him to fall asleep.  Patient reported poor appetite, despite generally having an adequate appetite. Patient reported he was previously employed doing  but has not worked in several months.  Patient was assessed with fair insight and judgment. Patient was assessed with fair ADLs and appearance.  As per patient's chart, patient has no history of aggression.    Psych rehab staff will continue to engage patient daily to build therapeutic rapport and assist patient in psych rehab goals pertaining to demonstrating medication and treatment compliance for improved symptom management within seven days.

## 2021-07-06 NOTE — BH SOCIAL WORK INITIAL PSYCHOSOCIAL EVALUATION - OTHER PAST PSYCHIATRIC HISTORY (INCLUDE DETAILS REGARDING ONSET, COURSE OF ILLNESS, INPATIENT/OUTPATIENT TREATMENT)
As per ED: pt is a 24 y/o male, domiciled w/ parents and siblings in [facility], previously working a temp job in , single, no dependants, reported history of schizophrenia vs schizoaffective disorder bipolar type, no pertinent PMH, tested COVID-19 + in May 2021 (completed Moderna series last month) NKDA, denies any SA or self-injurious behavior, family denies history of violence or aggression, 2 prior inpatient psych admissions, 1st in 2017, last in 5/12-25/2021 at [facility], in outpatient psychiatric tx at [facility], denies substance use (but reports eating CBD gummies)  reportedly last had Invega FORD on 5/25/2021, who was brought to the ED via EMS activated by patient who called 911 due to suicidal ideation in setting of missing dose of FORD.     Writer and Psych Rehab staff met with patient who presented as thought blocked, with latency of speech, some disorganized thought, though largely cooperative and engaged overall. He corroborated his report from the emergency room, and stated he is having a "Shakespearean experience" looking for his [place]. 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 he denies current SI/HI and AVH, though admits that in the past he has been profoundly paranoid and thought that a cult was after him.

## 2021-07-06 NOTE — BH SOCIAL WORK INITIAL PSYCHOSOCIAL EVALUATION - NSBHSUPPORTOTHER_PSY_ALL_CORE
Airway  Performed by: Meghan Claudio MD  Authorized by: Meghan Claudio MD     Final Airway Type:  Endotracheal airway  Final Endotracheal Airway*:  ETT  ETT Size (mm)*:  7.0  Cuff*:  Regular  Technique Used for Successful ETT Placement:  Direct laryngoscopy  Devices/Methods Used in Placement*:  Mask  Intubation Procedure*:  Preoxygenation, ETCO2, Atraumatic, Dentition Unchanged and Phaynx Clear  Insertion Site:  Oral  Blade Type*:  MAC  Blade Size*:  3  Placement Verified by: auscultation and capnometry    Glottic View*:  1 - full view of glottis  Attempts*:  1  Location:  OR  Urgency:  Elective  Difficult Airway: No    Indications for Airway Management:  Anesthesia  Mask Difficulty Assessment:  1 - vent by mask  Performed By:  Anesthesiologist         No

## 2021-07-06 NOTE — BH SOCIAL WORK INITIAL PSYCHOSOCIAL EVALUATION - NSBHFINANCESOCIAL_PSY_ALL_CORE
SURGICAL PA NOTE:     STATUS POST:      POST OPERATIVE DAY #:     Vital Signs Last 24 Hrs  T(C): 36.7, Max: 36.9 (03-19 @ 00:09)  T(F): 98, Max: 98.4 (03-19 @ 00:09)  HR: 82 (80 - 102)  BP: 110/53 (110/53 - 149/90)  BP(mean): --  RR: 18 (18 - 18)  SpO2: 95% (95% - 98%)    HPI:  83 years old female with PMH of A. Fib, CHF, DM, HTN, HLD and Asthma comes for PICC Line. Patient is s/p I&D and decortication for right empyema.  She is currently on Zosyn. Yesterday, PICC line was removed by home care from left arm and today she came for PICC line in her right arm. While patient was in Radiology, her son got a call that her H&H is dropping - so patient was sent to ER for further evaluation.  Patient is complaining of weakness but denies any fever, bleeding from wound or GI bleed. (16 Mar 2017 22:59)      Other injury  H/o or current diagnosis of HF- Contraindication to ACEI/ARBs  H/o or current diagnosis of HF- ACEI/ARB contraindication unknown  H/o or current diagnosis of HF- Contraindication to ACEI/ARBs  H/o or current diagnosis of HF- ACEI/ARB contraindication unknown  H/o or current diagnosis of HF- Contraindication to ACEI/ARBs  H/o or current diagnosis of HF- ACEI/ARB contraindication unknown  No pertinent family history in first degree relatives  Handoff  MEWS Score  Chronic congestive heart failure, unspecified congestive heart failure type  Moderate persistent asthma without complication  Osteoporosis  HLD (hyperlipidemia)  Depression  Diabetes mellitus  Hypertension  Atrial fibrillation  Wound infection  Acute deep vein thrombosis (DVT) of other vein of left upper extremity  Infiltration of peripherally inserted central catheter (PICC), initial encounter  Chest wall abscess  Anemia  Preventive measure  Atrial fibrillation  Depression  Hypertension  HLD (hyperlipidemia)  Diabetes mellitus  Wound infection  History of laparoscopic cholecystectomy  S/P hip replacement  S/P heart valve repair  MED EVAL  13      SUBJECTIVE: Pt seen lying supine with HOB up, VAC machine noted to be turned off - not on active suction, pt rolled onto left side, VAC dressing noted to be dislodged with leakage of serosang drainage on bedsheets, pt without c/o pain, SOB/dyspnea    **  Back: upper back wound  VAC dressing removed  wound clean without purulence, wound measures [id number] at its deepest)  New VAC applied with bridge to right ribs below right breast  good seal obtained  pressure set at 125  pt tolerated procedure well    SOB:  [ ] YES [ ] NO  Dyspnea: [ ]YES [ ]NO  Chest Discomfort: [ ] YES [ ] NO    Nausea: [ ] YES [ ] NO           Vomiting: [ ] YES [ ] NO  Flatus: [ ] YES [ ] NO             Bowel Movement: [ ] YES [ ] NO  Diarrhea: [ ] YES [ ] NO         Void: [ ]YES [ ]No  Constipation: [ ] YES [ ] NO     Pain (0-10):              Pain Control Adequate: [ ] YES [ ] NO  Menezes:  NGT:      I&O's Detail    I & Os for current day (as of 19 Mar 2017 16:37)  =============================================  IN:    IV PiggyBack: 100 ml    Total IN: 100 ml  ---------------------------------------------  OUT:    Total OUT: 0 ml  ---------------------------------------------  Total NET: 100 ml    I&O's Summary    I & Os for current day (as of 19 Mar 2017 16:37)  =============================================  IN: 100 ml / OUT: 0 ml / NET: 100 ml    I&O's Detail    I & Os for current day (as of 19 Mar 2017 16:37)  =============================================  IN:    IV PiggyBack: 100 ml    Total IN: 100 ml  ---------------------------------------------  OUT:    Total OUT: 0 ml  ---------------------------------------------  Total NET: 100 ml      MEDICATIONS  (STANDING):  insulin lispro (HumaLOG) corrective regimen sliding scale  SubCutaneous Before meals and at bedtime  dextrose 5%. 1000milliLiter(s) IV Continuous <Continuous>  dextrose 50% Injectable 12.5Gram(s) IV Push once  dextrose 50% Injectable 25Gram(s) IV Push once  dextrose 50% Injectable 25Gram(s) IV Push once  aspirin 325milliGRAM(s) Oral daily  escitalopram 20milliGRAM(s) Oral daily  simvastatin 40milliGRAM(s) Oral at bedtime  megestrol Suspension 625milliGRAM(s) Oral daily  QUEtiapine 25milliGRAM(s) Oral two times a day  metoprolol succinate ER 25milliGRAM(s) Oral daily  buDESOnide 160 MICROgram(s)/formoterol 4.5 MICROgram(s) Inhaler 2Puff(s) Inhalation two times a day  ferrous    sulfate 325milliGRAM(s) Oral two times a day with meals  senna 2Tablet(s) Oral at bedtime  montelukast 10milliGRAM(s) Oral at bedtime  zinc sulfate 220milliGRAM(s) Oral daily  piperacillin/tazobactam IVPB. 3.375Gram(s) IV Intermittent every 8 hours  buPROPion 75milliGRAM(s) Oral daily  multivitamin 1Tablet(s) Oral daily  ascorbic acid 500milliGRAM(s) Oral daily  folic acid 1milliGRAM(s) Oral daily  lactobacillus acidophilus 1Tablet(s) Oral two times a day with meals  enoxaparin Injectable 80milliGRAM(s) SubCutaneous two times a day    MEDICATIONS  (PRN):  dextrose Gel 1Dose(s) Oral once PRN Blood Glucose LESS THAN 70 milliGRAM(s)/deciLiter  glucagon  Injectable 1milliGRAM(s) IntraMuscular once PRN Glucose <70 milliGRAM(s)/deciLiter      LABS:                        8.0    10.2  )-----------( 237      ( 18 Mar 2017 08:14 )             24.7     19 Mar 2017 06:49    144    |  108    |  9.0    ----------------------------<  123    3.6     |  26.0   |  0.64     Ca    8.3        19 Mar 2017 06:49              RADIOLOGY & ADDITIONAL STUDIES: None

## 2021-07-06 NOTE — PSYCHIATRIC REHAB INITIAL EVALUATION - NSBHALCSUBCHOICE_PSY_ALL_CORE
Patient reported he drank one beer prior to admission. As per patient's chart, patient used CBD gummies.

## 2021-07-07 PROCEDURE — 99232 SBSQ HOSP IP/OBS MODERATE 35: CPT

## 2021-07-07 RX ORDER — LITHIUM CARBONATE 300 MG/1
450 TABLET, EXTENDED RELEASE ORAL AT BEDTIME
Refills: 0 | Status: DISCONTINUED | OUTPATIENT
Start: 2021-07-07 | End: 2021-07-08

## 2021-07-07 RX ORDER — GABAPENTIN 400 MG/1
300 CAPSULE ORAL
Refills: 0 | Status: DISCONTINUED | OUTPATIENT
Start: 2021-07-07 | End: 2021-07-20

## 2021-07-07 RX ORDER — GABAPENTIN 400 MG/1
300 CAPSULE ORAL AT BEDTIME
Refills: 0 | Status: DISCONTINUED | OUTPATIENT
Start: 2021-07-07 | End: 2021-07-20

## 2021-07-07 RX ORDER — TRAZODONE HCL 50 MG
50 TABLET ORAL AT BEDTIME
Refills: 0 | Status: DISCONTINUED | OUTPATIENT
Start: 2021-07-07 | End: 2021-07-20

## 2021-07-07 RX ADMIN — RISPERIDONE 2 MILLIGRAM(S): 4 TABLET ORAL at 21:29

## 2021-07-07 RX ADMIN — PALIPERIDONE 156 MILLIGRAM(S): 1.5 TABLET, EXTENDED RELEASE ORAL at 17:33

## 2021-07-07 RX ADMIN — LITHIUM CARBONATE 450 MILLIGRAM(S): 300 TABLET, EXTENDED RELEASE ORAL at 21:30

## 2021-07-07 RX ADMIN — GABAPENTIN 300 MILLIGRAM(S): 400 CAPSULE ORAL at 21:29

## 2021-07-07 NOTE — DIETITIAN INITIAL EVALUATION ADULT. - OTHER INFO
Pt admitted to [facility] for worsening mood and suicidal ideations. Reports good appetite/po intake at present. No GI distress noted. Food preferences taken and implemented. Discussed Healthy eating and weight management. Pt verbalized understanding.

## 2021-07-08 PROCEDURE — 99232 SBSQ HOSP IP/OBS MODERATE 35: CPT

## 2021-07-08 RX ORDER — LITHIUM CARBONATE 300 MG/1
900 TABLET, EXTENDED RELEASE ORAL AT BEDTIME
Refills: 0 | Status: DISCONTINUED | OUTPATIENT
Start: 2021-07-08 | End: 2021-07-14

## 2021-07-08 RX ORDER — RISPERIDONE 4 MG/1
3 TABLET ORAL AT BEDTIME
Refills: 0 | Status: DISCONTINUED | OUTPATIENT
Start: 2021-07-08 | End: 2021-07-14

## 2021-07-08 RX ADMIN — LITHIUM CARBONATE 900 MILLIGRAM(S): 300 TABLET, EXTENDED RELEASE ORAL at 21:26

## 2021-07-08 RX ADMIN — GABAPENTIN 300 MILLIGRAM(S): 400 CAPSULE ORAL at 21:26

## 2021-07-08 RX ADMIN — RISPERIDONE 3 MILLIGRAM(S): 4 TABLET ORAL at 21:26

## 2021-07-08 NOTE — BH INPATIENT PSYCHIATRY PROGRESS NOTE - OTHER
mild PMR Pt narrative apathetic vs negative symptoms vague, instant parsing of proverbs poor to fair, but slightly improved

## 2021-07-09 PROCEDURE — 99232 SBSQ HOSP IP/OBS MODERATE 35: CPT

## 2021-07-09 PROCEDURE — 90832 PSYTX W PT 30 MINUTES: CPT

## 2021-07-09 RX ADMIN — LITHIUM CARBONATE 900 MILLIGRAM(S): 300 TABLET, EXTENDED RELEASE ORAL at 20:53

## 2021-07-09 RX ADMIN — RISPERIDONE 3 MILLIGRAM(S): 4 TABLET ORAL at 20:53

## 2021-07-09 RX ADMIN — GABAPENTIN 300 MILLIGRAM(S): 400 CAPSULE ORAL at 20:53

## 2021-07-09 NOTE — BH PSYCHOLOGY - CLINICIAN PSYCHOTHERAPY NOTE - NSBHPSYCHOLGOALS_PSY_A_CORE
Decrease symptoms/Assessment/Improve level of independent functioning/Improve social/vocational/coping skills

## 2021-07-09 NOTE — BH PSYCHOLOGY - CLINICIAN PSYCHOTHERAPY NOTE - NSBHPSYCHOLNARRATIVE_PSY_A_CORE FT
Writer and PT wore masks due to COVID19 precautions. Pt was adequately groomed, casually dressed. Reported mood as ok, constricted affect demonstrated. Avoidant eye contact. Thought process linear, speech muffled at times. Pt denied suicidal ideation, plan or intent. Pt did not endorse HI. Oriented x3. Limited - fair insight demonstrated.    The pt reported that approx. once per year his brain gets "highjacked" and he feels confused and lost. Pt reported after a stressful moment with his family he decided to go to [facility]. He reported on the way there he got lost, confused and called [employer]. Pt discussed pattern of this type of even happening and frustration with it. Pt reported he follows up with outpatient treatment but sometimes it is difficult for him to wait between therapy appointments for the next appointment. Provided empathy and support to patient. Discussed patient's goals for himself while in the hospital and out of the hospital.

## 2021-07-09 NOTE — BH INPATIENT PSYCHIATRY PROGRESS NOTE - OTHER
apathetic at times vague, instant parsing of proverbs poor to fair, but slightly improved mild PMR apathetic vs negative symptoms, slightly attenuated, engaged in discussion re instruments KONG and BDA Pt narrative

## 2021-07-10 RX ADMIN — LITHIUM CARBONATE 900 MILLIGRAM(S): 300 TABLET, EXTENDED RELEASE ORAL at 20:18

## 2021-07-10 RX ADMIN — RISPERIDONE 3 MILLIGRAM(S): 4 TABLET ORAL at 20:18

## 2021-07-10 RX ADMIN — GABAPENTIN 300 MILLIGRAM(S): 400 CAPSULE ORAL at 20:18

## 2021-07-11 RX ADMIN — LITHIUM CARBONATE 900 MILLIGRAM(S): 300 TABLET, EXTENDED RELEASE ORAL at 21:21

## 2021-07-11 RX ADMIN — RISPERIDONE 3 MILLIGRAM(S): 4 TABLET ORAL at 21:21

## 2021-07-11 RX ADMIN — GABAPENTIN 300 MILLIGRAM(S): 400 CAPSULE ORAL at 21:20

## 2021-07-12 PROCEDURE — 99232 SBSQ HOSP IP/OBS MODERATE 35: CPT

## 2021-07-12 RX ORDER — VENLAFAXINE HCL 75 MG
37.5 CAPSULE, EXT RELEASE 24 HR ORAL DAILY
Refills: 0 | Status: DISCONTINUED | OUTPATIENT
Start: 2021-07-13 | End: 2021-07-13

## 2021-07-12 RX ADMIN — GABAPENTIN 300 MILLIGRAM(S): 400 CAPSULE ORAL at 20:59

## 2021-07-12 RX ADMIN — LITHIUM CARBONATE 900 MILLIGRAM(S): 300 TABLET, EXTENDED RELEASE ORAL at 20:59

## 2021-07-12 RX ADMIN — RISPERIDONE 3 MILLIGRAM(S): 4 TABLET ORAL at 20:59

## 2021-07-12 NOTE — BH INPATIENT PSYCHIATRY PROGRESS NOTE - OTHER
mild PMR vague, instant parsing of proverbs Pt narrative apathetic at times apathetic vs negative symptoms, slightly attenuated, engaged in discussion re instruments OKNG and BDA, tolerated discussion well but unable to define anxiety poor to fair, but improving

## 2021-07-13 LAB
A1C WITH ESTIMATED AVERAGE GLUCOSE RESULT: 5.1 % — SIGNIFICANT CHANGE UP (ref 4–5.6)
ANION GAP SERPL CALC-SCNC: 12 MMOL/L — SIGNIFICANT CHANGE UP (ref 7–14)
BUN SERPL-MCNC: 16 MG/DL — SIGNIFICANT CHANGE UP (ref 7–23)
CALCIUM SERPL-MCNC: 9.6 MG/DL — SIGNIFICANT CHANGE UP (ref 8.4–10.5)
CHLORIDE SERPL-SCNC: 103 MMOL/L — SIGNIFICANT CHANGE UP (ref 98–107)
CO2 SERPL-SCNC: 23 MMOL/L — SIGNIFICANT CHANGE UP (ref 22–31)
CREAT SERPL-MCNC: 1.06 MG/DL — SIGNIFICANT CHANGE UP (ref 0.5–1.3)
ESTIMATED AVERAGE GLUCOSE: 100 — SIGNIFICANT CHANGE UP
GLUCOSE SERPL-MCNC: 84 MG/DL — SIGNIFICANT CHANGE UP (ref 70–99)
LITHIUM SERPL-MCNC: 0.6 MMOL/L — SIGNIFICANT CHANGE UP (ref 0.6–1.2)
POTASSIUM SERPL-MCNC: 4.2 MMOL/L — SIGNIFICANT CHANGE UP (ref 3.5–5.3)
POTASSIUM SERPL-SCNC: 4.2 MMOL/L — SIGNIFICANT CHANGE UP (ref 3.5–5.3)
SODIUM SERPL-SCNC: 138 MMOL/L — SIGNIFICANT CHANGE UP (ref 135–145)

## 2021-07-13 PROCEDURE — 99232 SBSQ HOSP IP/OBS MODERATE 35: CPT

## 2021-07-13 RX ORDER — PALIPERIDONE 1.5 MG/1
156 TABLET, EXTENDED RELEASE ORAL ONCE
Refills: 0 | Status: COMPLETED | OUTPATIENT
Start: 2021-07-14 | End: 2021-07-14

## 2021-07-13 RX ORDER — VENLAFAXINE HCL 75 MG
75 CAPSULE, EXT RELEASE 24 HR ORAL DAILY
Refills: 0 | Status: DISCONTINUED | OUTPATIENT
Start: 2021-07-14 | End: 2021-07-20

## 2021-07-13 RX ADMIN — Medication 37.5 MILLIGRAM(S): at 09:00

## 2021-07-13 RX ADMIN — LITHIUM CARBONATE 900 MILLIGRAM(S): 300 TABLET, EXTENDED RELEASE ORAL at 20:51

## 2021-07-13 RX ADMIN — RISPERIDONE 3 MILLIGRAM(S): 4 TABLET ORAL at 20:50

## 2021-07-13 RX ADMIN — GABAPENTIN 300 MILLIGRAM(S): 400 CAPSULE ORAL at 20:50

## 2021-07-13 NOTE — BH INPATIENT PSYCHIATRY PROGRESS NOTE - OTHER
mild PMR apathetic at times but slightly brighter affect emerging apathetic vs negative symptoms, slightly attenuated, engaged in discussion re instruments KONG and BDA, tolerated discussion well but unable to define anxiety poor to fair, but improving Pt narrative vague, instant parsing of proverbs

## 2021-07-14 PROCEDURE — 99232 SBSQ HOSP IP/OBS MODERATE 35: CPT

## 2021-07-14 RX ORDER — RISPERIDONE 4 MG/1
1 TABLET ORAL AT BEDTIME
Refills: 0 | Status: COMPLETED | OUTPATIENT
Start: 2021-07-16 | End: 2021-07-17

## 2021-07-14 RX ORDER — LITHIUM CARBONATE 300 MG/1
1125 TABLET, EXTENDED RELEASE ORAL AT BEDTIME
Refills: 0 | Status: DISCONTINUED | OUTPATIENT
Start: 2021-07-14 | End: 2021-07-20

## 2021-07-14 RX ORDER — PALIPERIDONE 1.5 MG/1
1 TABLET, EXTENDED RELEASE ORAL
Qty: 30 | Refills: 0
Start: 2021-07-14 | End: 2021-08-12

## 2021-07-14 RX ORDER — RISPERIDONE 4 MG/1
2 TABLET ORAL AT BEDTIME
Refills: 0 | Status: COMPLETED | OUTPATIENT
Start: 2021-07-14 | End: 2021-07-15

## 2021-07-14 RX ADMIN — GABAPENTIN 300 MILLIGRAM(S): 400 CAPSULE ORAL at 21:23

## 2021-07-14 RX ADMIN — LITHIUM CARBONATE 1125 MILLIGRAM(S): 300 TABLET, EXTENDED RELEASE ORAL at 21:23

## 2021-07-14 RX ADMIN — Medication 75 MILLIGRAM(S): at 08:52

## 2021-07-14 RX ADMIN — PALIPERIDONE 156 MILLIGRAM(S): 1.5 TABLET, EXTENDED RELEASE ORAL at 16:10

## 2021-07-14 RX ADMIN — RISPERIDONE 2 MILLIGRAM(S): 4 TABLET ORAL at 21:23

## 2021-07-14 NOTE — BH TREATMENT PLAN - NSCMSPTSTRENGTHS_PSY_ALL_CORE
Able to adapt/Expressive of emotions/Intelligence/Interpersonal skills/Positive attitude/Self-reliant/Supportive family/Tolerant
Able to adapt/Expressive of emotions/Flexibility/Intelligence/Interpersonal skills/Positive attitude/Self-reliant/Supportive family/Tolerant

## 2021-07-14 NOTE — BH INPATIENT PSYCHIATRY PROGRESS NOTE - OTHER
mild PMR vague, instant parsing of proverbs apathetic vs negative symptoms, slightly attenuated, engaged in discussion re instruments again today, tolerated discussion well, more engaged in tx apathetic at times but slightly brighter affect emerging Pt narrative

## 2021-07-14 NOTE — BH TREATMENT PLAN - NSTXDEPRESGOAL_PSY_ALL_CORE
Will identify 2 coping skills that assist in improving mood
Report journaling 5 counter-statements to negative predictions/self-image daily

## 2021-07-14 NOTE — BH TREATMENT PLAN - NSTXPATIENTPARTICIPATE_PSY_ALL_CORE
Patient participated in identification of needs/problems/goals for treatment/Patient participated in defining interventions
Patient participated in identification of needs/problems/goals for treatment/Patient participated in defining interventions/Patient participated in development of after care plan

## 2021-07-14 NOTE — BH TREATMENT PLAN - NSTXDCOPNOINTERSW_PSY_ALL_CORE
SW will provide support, education and ongoing discussion of dc plans to patient and family
SW will provide support, education and ongoing discussion of dc plans to patient and family

## 2021-07-14 NOTE — BH TREATMENT PLAN - NSTXMEDICINTERPR_PSY_ALL_CORE
Psych rehab staff will continue to engage patient daily to build theraputic rapport and assist patient in psych rehab goals pertaining to demonstrating consistent medication and treatment compliance for improved symptom management within seven days.
Patient has demonstrated daily medication compliance, denying side effects during session with writer, and he endorsed improved mood and focus as well as decreased anxiety as related benefits. Patient has been mostly isolative to self/room and apparently hyper somnolent, but he has maintained behavioral control. Patient has attended to ADL on the unit and affect has shown slight improvement. Patient remains minimally verbal during interactions with writer, but he is polite and often initiates exchanges. Patient often appears internally preoccupied, but he has been able to verbalize some needs/concerns to staff without prompt, and he identified goals for recovery during session with writer. Patient has been intermittently social with select peers.     Over the next 7 days, psychiatric rehabilitation staff will continue to provide purposeful/psychoeducation rounding, individual supportive counseling sessions, and group therapy sessions where CoVid-19 mandates permit, to assist patient in maintaining medication/treatment compliance and identifying related benefit for improved symptom management and insight.

## 2021-07-14 NOTE — BH TREATMENT PLAN - NSTXPSYCHOGOAL_PSY_ALL_CORE
Will report using relaxation skills 3 times a day to reduce anxiety about delusions/hallucinations
Will report command hallucinations to staff

## 2021-07-14 NOTE — BH TREATMENT PLAN - NSTXDCOPLKINTERSW_PSY_ALL_CORE
Support and psychoed being provided.  Writer met with patient who is not interested in PHP at this time as he wants to get a job but he does want a referral to AOPD here and his mother also wanted him referred here.

## 2021-07-14 NOTE — BH TREATMENT PLAN - NSTXNEGATGOAL_PSY_ALL_CORE
Will be able to demonstrate understanding of self-talk and its relationship to self-image and communication through discussion with staff once a day
Will seek support from staff when upset by negative self-talk

## 2021-07-14 NOTE — BH TREATMENT PLAN - NSTXIMPULSGOAL_PSY_ALL_CORE
Will be able to demonstrate the ability to pause before acting out negatively
Will be able to describe/demonstrate alternative ways of managing his/her emotional state on the unit

## 2021-07-14 NOTE — BH TREATMENT PLAN - NSTXPLANTHERAPYSESSIONSFT_PSY_ALL_CORE
07-12-21  Type of therapy: Other  Type of session: Individual  Level of patient participation: Attentive,Engaged,Participates  Duration of participation: 15 minutes  Therapy conducted by: Psych rehab  Therapy Summary: Writer met with patient to discuss progress toward psychiatric rehabilitation goals and to provide supportive counseling. Patient described feeling calm and rested and he identified emotional awareness, managing racing/intrusive thoughts, and improving frustration tolerance as goals for recovery. Patient has been increasingly more reciprocal during interactions with writer and he identified keeping a journal to cope with the challenge of speaking his mind and ordering his thoughts. Patient identified short term goal of sorting out vocational opportunities, post-discharge and he denied suicidal ideation, homicidal ideation, or psychotic symptoms during session with writer. Writer provided psychoeducation as well as printed material on emotional awareness and coping with racing/intrusive thoughts and encouraged patient to maintain communication with staff regarding his needs/concerns. Patient was further encouraged to actively participate in his psychiatric treatment to the best of his ability.    07-12-21  Type of therapy: Coping skills,Creative arts therapy,Health and fitness,Medication management,Music therapy,Psychoeducation,Stress management,Symptom management  Type of session: Group  Level of patient participation: Engaged,Participated with encouragement  Duration of participation: 45 minutes  Therapy conducted by: Psych rehab  Therapy Summary: Patient has attended approximately 60% of daily psychiatric rehabilitation groups, and he has shared content preference to writer one more than one occasion. Patient has been fairly engaged during group discussion/activity, participating with prompt from facilitator, and demonstrating behavioral control Patient identified learning and positive occupation as benefit of group attendance.  
  07-06-21  Type of therapy: Initial psych rehab assessment.   Type of session: Individual  Level of patient participation: Engaged  Duration of participation: 30 minutes  --  Therapy Summary: Writer and  met with patient to introduce patient to  6. Patient is a 25 year old, domiciled with parents and sister, unemployed,  male, admitted to [facility] on 7/5/2021 by EMS activated by patient who called 911 due to suicidal ideation in the context of missing a dose of his FORD.  Patient denied active SI/HI as well as AH/VH during initial psych rehab assessment.  Patient has previous history of 2 prior inpatient psychiatric admissions, 1st in 2017 and last in May 2021 at [facility].  Patient is currently in outpatient at [facility]. Patient denied substance use, reporting he drank one large bottle of beer prior to admission.  As per patient's chart, patient uses CBD gummies.  Patient was calm and cooperative during assessment, reporting he often has "lucid" dreams which make it difficult for him to fall asleep.  Patient reported poor appetite, despite generally having an adequate appetite. Patient reported he was previously employed doing  but has not worked in several months.  Patient was assessed with fair insight and judgment. Patient was assessed with fair ADLs and appearance.  As per patient's chart, patient has no history of aggression.    Psych rehab staff will continue to engage patient daily to build therapeutic rapport and assist patient in psych rehab goals pertaining to demonstrating medication and treatment compliance for improved symptom management within seven days.

## 2021-07-14 NOTE — BH TREATMENT PLAN - NSTXANXGOAL_PSY_ALL_CORE
Be able to participate in activities despite lingering anxiety/panic
Identify and practice 3 coping skills to manage anxiety

## 2021-07-15 PROBLEM — F20.9 SCHIZOPHRENIA, UNSPECIFIED: Chronic | Status: ACTIVE | Noted: 2021-07-04

## 2021-07-15 PROCEDURE — 99232 SBSQ HOSP IP/OBS MODERATE 35: CPT

## 2021-07-15 RX ADMIN — RISPERIDONE 2 MILLIGRAM(S): 4 TABLET ORAL at 21:10

## 2021-07-15 RX ADMIN — LITHIUM CARBONATE 1125 MILLIGRAM(S): 300 TABLET, EXTENDED RELEASE ORAL at 21:09

## 2021-07-15 RX ADMIN — Medication 75 MILLIGRAM(S): at 09:15

## 2021-07-15 RX ADMIN — GABAPENTIN 300 MILLIGRAM(S): 400 CAPSULE ORAL at 21:10

## 2021-07-15 NOTE — BH DISCHARGE NOTE NURSING/SOCIAL WORK/PSYCH REHAB - DISCHARGE INSTRUCTIONS AFTERCARE APPOINTMENTS
In order to check the location, date, or time of your aftercare appointment, please refer to your Discharge Instructions Document given to you upon leaving the hospital.  If you have lost the instructions please call [phone]

## 2021-07-15 NOTE — BH DISCHARGE NOTE NURSING/SOCIAL WORK/PSYCH REHAB - NSDCPRRECOMMEND_PSY_ALL_CORE
Patient will benefit from beginning AOPD at [facility] for ongoing medication managements support and psychotherapy.

## 2021-07-15 NOTE — BH DISCHARGE NOTE NURSING/SOCIAL WORK/PSYCH REHAB - PATIENT PORTAL LINK FT
You can access the FollowMyHealth Patient Portal offered by [facility] by registering at the following website: [email or web address]. By joining [email or web address]’s FollowMyHealth portal, you will also be able to view your health information using other applications (apps) compatible with our system.

## 2021-07-15 NOTE — BH INPATIENT PSYCHIATRY PROGRESS NOTE - OTHER
apathetic at times but slightly brighter affect emerging apathetic vs negative symptoms, slightly attenuated, engaged in discussion re instruments KONG and BDA, tolerated discussion well but unable to define anxiety improving  mild PMR poor to fair, but improving vague, instant parsing of proverbs

## 2021-07-15 NOTE — BH DISCHARGE NOTE NURSING/SOCIAL WORK/PSYCH REHAB - NSDCPRGOAL_PSY_ALL_CORE
During the current hospitalization patient has been working on psych rehab goals pertaining to demonstrating medication and treatment compliance. Patient has made good progress.  Writer met with patient for final psych rehab assessment. Patient reported "good" mood and was assessed with slightly constricted affect. Patient denied SI/HI as well as AH/VH.  Patient has been calm, cooperative throughout the current hospitalization. Patient has not been a behavioral management issue. Patient is assessed with fair insight and judgement. Patient reported he is eager and motivated for discharge and reported he would like to return to work. Patient is able to maintain ADLs and appearance.    Patient completed a Press Ganey Survey and Patient Safety Template prior to discharge.

## 2021-07-15 NOTE — BH DISCHARGE NOTE NURSING/SOCIAL WORK/PSYCH REHAB - NSBHDCADDR1FT_A_CORE
Tele health Tele health- they will call you to pre-register you. You will be provided a zoom ID during phone call

## 2021-07-15 NOTE — BH DISCHARGE NOTE NURSING/SOCIAL WORK/PSYCH REHAB - NSCDUDCCRISIS_PSY_A_CORE
[facility]  1 (204) [facility]-WELL ([phone])  Text "WELL" to 24911  Website: [email or web address]/.[employer] 1 (121) 571-PWPN (8568) Website: www.safehorizon.org/.National Suicide Prevention Lifeline 8 (748) 261-2486/.  [employer]  1 (120) LIF[employer] (229-0795)/.  [facility]  [phone] [place] [address] [phone]   Hours:  Monday through Friday from 9 AM to 3 PM/.  [employer] - [facility] Crisis Line  [phone], Option 1

## 2021-07-16 PROCEDURE — 99232 SBSQ HOSP IP/OBS MODERATE 35: CPT

## 2021-07-16 RX ADMIN — GABAPENTIN 300 MILLIGRAM(S): 400 CAPSULE ORAL at 20:56

## 2021-07-16 RX ADMIN — Medication 75 MILLIGRAM(S): at 08:26

## 2021-07-16 RX ADMIN — LITHIUM CARBONATE 1125 MILLIGRAM(S): 300 TABLET, EXTENDED RELEASE ORAL at 20:58

## 2021-07-16 RX ADMIN — RISPERIDONE 1 MILLIGRAM(S): 4 TABLET ORAL at 20:56

## 2021-07-16 NOTE — BH INPATIENT PSYCHIATRY PROGRESS NOTE - NSBHPROGMEDSE_PSY_A_CORE FT
reports hx of wt gain from [facility]

## 2021-07-17 RX ADMIN — Medication 75 MILLIGRAM(S): at 09:04

## 2021-07-17 RX ADMIN — RISPERIDONE 1 MILLIGRAM(S): 4 TABLET ORAL at 20:37

## 2021-07-17 RX ADMIN — GABAPENTIN 300 MILLIGRAM(S): 400 CAPSULE ORAL at 20:37

## 2021-07-17 RX ADMIN — LITHIUM CARBONATE 1125 MILLIGRAM(S): 300 TABLET, EXTENDED RELEASE ORAL at 20:37

## 2021-07-18 RX ADMIN — Medication 75 MILLIGRAM(S): at 08:48

## 2021-07-18 RX ADMIN — LITHIUM CARBONATE 1125 MILLIGRAM(S): 300 TABLET, EXTENDED RELEASE ORAL at 21:15

## 2021-07-18 RX ADMIN — GABAPENTIN 300 MILLIGRAM(S): 400 CAPSULE ORAL at 21:15

## 2021-07-19 PROCEDURE — 99232 SBSQ HOSP IP/OBS MODERATE 35: CPT

## 2021-07-19 RX ORDER — LITHIUM CARBONATE 300 MG/1
2.5 TABLET, EXTENDED RELEASE ORAL
Qty: 75 | Refills: 0
Start: 2021-07-19 | End: 2021-08-17

## 2021-07-19 RX ORDER — VENLAFAXINE HCL 75 MG
37.5 CAPSULE, EXT RELEASE 24 HR ORAL ONCE
Refills: 0 | Status: COMPLETED | OUTPATIENT
Start: 2021-07-19 | End: 2021-07-19

## 2021-07-19 RX ORDER — DESVENLAFAXINE 50 MG/1
1 TABLET, EXTENDED RELEASE ORAL
Qty: 30 | Refills: 0
Start: 2021-07-19 | End: 2021-08-17

## 2021-07-19 RX ORDER — PALIPERIDONE 1.5 MG/1
156 TABLET, EXTENDED RELEASE ORAL
Qty: 1 | Refills: 0
Start: 2021-07-19 | End: 2021-07-19

## 2021-07-19 RX ADMIN — LITHIUM CARBONATE 1125 MILLIGRAM(S): 300 TABLET, EXTENDED RELEASE ORAL at 20:57

## 2021-07-19 RX ADMIN — Medication 37.5 MILLIGRAM(S): at 14:45

## 2021-07-19 RX ADMIN — Medication 75 MILLIGRAM(S): at 08:35

## 2021-07-19 RX ADMIN — GABAPENTIN 300 MILLIGRAM(S): 400 CAPSULE ORAL at 20:58

## 2021-07-19 RX ADMIN — Medication 50 MILLIGRAM(S): at 20:58

## 2021-07-19 NOTE — BH INPATIENT PSYCHIATRY PROGRESS NOTE - OTHER
vague, instant parsing of proverbs apathetic vs negative symptoms, further attenuated, engaged in discussion re instruments KONG and BDA, tolerated discussion well but unable to define anxiety; now given numerous other instruments and completed DES2 dissociation scale today mild alogia but difficulty "finding the words to put on my thoughts" and feelings some paucity of ideas evident mild PMR Pt narrative less apathetic, brighter affect emerging

## 2021-07-20 ENCOUNTER — OUTPATIENT (OUTPATIENT)
Dept: OUTPATIENT SERVICES | Facility: HOSPITAL | Age: 26
LOS: 1 days | Discharge: HOME | End: 2021-07-20
Payer: MEDICAID

## 2021-07-20 VITALS — TEMPERATURE: 98 F | HEART RATE: 62 BPM | DIASTOLIC BLOOD PRESSURE: 72 MMHG | SYSTOLIC BLOOD PRESSURE: 121 MMHG

## 2021-07-20 LAB — VIT B1 SERPL-MCNC: 112.4 NMOL/L — SIGNIFICANT CHANGE UP (ref 66.5–200)

## 2021-07-20 PROCEDURE — 99239 HOSP IP/OBS DSCHRG MGMT >30: CPT

## 2021-07-20 RX ORDER — DESVENLAFAXINE 50 MG/1
1 TABLET, EXTENDED RELEASE ORAL
Qty: 30 | Refills: 0
Start: 2021-07-20 | End: 2021-08-18

## 2021-07-20 RX ADMIN — Medication 75 MILLIGRAM(S): at 08:40

## 2021-07-20 NOTE — BH INPATIENT PSYCHIATRY PROGRESS NOTE - NSTXDEPRESGOAL_PSY_ALL_CORE
Will identify 2 coping skills that assist in improving mood
Will identify 2 coping skills that assist in improving mood
Report journaling 5 counter-statements to negative predictions/self-image daily
Will identify 2 coping skills that assist in improving mood
Report journaling 5 counter-statements to negative predictions/self-image daily
Will identify 2 coping skills that assist in improving mood
Will identify 2 coping skills that assist in improving mood
Report using a coping skill to overcome sadness and worry in order to socialize with peers daily
Will identify 2 coping skills that assist in improving mood

## 2021-07-20 NOTE — BH INPATIENT PSYCHIATRY PROGRESS NOTE - NSTXSUICIDGOAL_PSY_ALL_CORE
Will express feelings associated with suicidal ideation
Will express feelings associated with suicidal ideation
Be able to state 3 reasons for living
Be able to state 3 reasons for living
Will express feelings associated with suicidal ideation
Be able to report that they independently used a coping skill instead of hurting oneself
Will express feelings associated with suicidal ideation

## 2021-07-20 NOTE — BH INPATIENT PSYCHIATRY PROGRESS NOTE - CURRENT MEDICATION
MEDICATIONS  (STANDING):  gabapentin 300 milliGRAM(s) Oral at bedtime  lithium CR (ESKALITH-CR) 900 milliGRAM(s) Oral at bedtime  risperiDONE   Tablet 3 milliGRAM(s) Oral at bedtime    MEDICATIONS  (PRN):  diphenhydrAMINE 50 milliGRAM(s) Oral every 6 hours PRN Agitation/EPS  diphenhydrAMINE   Injectable 50 milliGRAM(s) IntraMuscular every 6 hours PRN severe agitation  gabapentin 300 milliGRAM(s) Oral four times a day PRN anxiety  haloperidol     Tablet 5 milliGRAM(s) Oral every 6 hours PRN agitation  haloperidol    Injectable 5 milliGRAM(s) IntraMuscular every 6 hours PRN severe agitation  LORazepam     Tablet 2 milliGRAM(s) Oral every 6 hours PRN Agitation  LORazepam     Tablet 1 milliGRAM(s) Oral at bedtime PRN anxiety/insomnia/schizoaffective disorder  LORazepam   Injectable 2 milliGRAM(s) IntraMuscular once PRN severe agitation  traZODone 50 milliGRAM(s) Oral at bedtime PRN insomnia  
MEDICATIONS  (STANDING):  gabapentin 300 milliGRAM(s) Oral at bedtime  lithium CR (ESKALITH-CR) 1125 milliGRAM(s) Oral at bedtime  risperiDONE   Tablet 1 milliGRAM(s) Oral at bedtime  venlafaxine XR 75 milliGRAM(s) Oral daily    MEDICATIONS  (PRN):  diphenhydrAMINE 50 milliGRAM(s) Oral every 6 hours PRN Agitation/EPS  diphenhydrAMINE   Injectable 50 milliGRAM(s) IntraMuscular every 6 hours PRN severe agitation  gabapentin 300 milliGRAM(s) Oral four times a day PRN anxiety  haloperidol     Tablet 5 milliGRAM(s) Oral every 6 hours PRN agitation  haloperidol    Injectable 5 milliGRAM(s) IntraMuscular every 6 hours PRN severe agitation  LORazepam     Tablet 2 milliGRAM(s) Oral every 6 hours PRN Agitation  LORazepam   Injectable 2 milliGRAM(s) IntraMuscular once PRN severe agitation  traZODone 50 milliGRAM(s) Oral at bedtime PRN insomnia  
MEDICATIONS  (STANDING):  gabapentin 300 milliGRAM(s) Oral at bedtime  lithium CR (ESKALITH-CR) 450 milliGRAM(s) Oral at bedtime  risperiDONE   Tablet 2 milliGRAM(s) Oral at bedtime    MEDICATIONS  (PRN):  diphenhydrAMINE 50 milliGRAM(s) Oral every 6 hours PRN Agitation/EPS  diphenhydrAMINE   Injectable 50 milliGRAM(s) IntraMuscular every 6 hours PRN severe agitation  gabapentin 300 milliGRAM(s) Oral four times a day PRN anxiety  haloperidol     Tablet 5 milliGRAM(s) Oral every 6 hours PRN agitation  haloperidol    Injectable 5 milliGRAM(s) IntraMuscular every 6 hours PRN severe agitation  hydrOXYzine hydrochloride 50 milliGRAM(s) Oral every 6 hours PRN anxiety  LORazepam     Tablet 2 milliGRAM(s) Oral every 6 hours PRN Agitation  LORazepam     Tablet 1 milliGRAM(s) Oral at bedtime PRN anxiety/insomnia/schizoaffective disorder  LORazepam   Injectable 2 milliGRAM(s) IntraMuscular once PRN severe agitation  traZODone 50 milliGRAM(s) Oral at bedtime PRN insomnia  
MEDICATIONS  (STANDING):  gabapentin 300 milliGRAM(s) Oral at bedtime  lithium CR (ESKALITH-CR) 900 milliGRAM(s) Oral at bedtime  risperiDONE   Tablet 3 milliGRAM(s) Oral at bedtime    MEDICATIONS  (PRN):  diphenhydrAMINE 50 milliGRAM(s) Oral every 6 hours PRN Agitation/EPS  diphenhydrAMINE   Injectable 50 milliGRAM(s) IntraMuscular every 6 hours PRN severe agitation  gabapentin 300 milliGRAM(s) Oral four times a day PRN anxiety  haloperidol     Tablet 5 milliGRAM(s) Oral every 6 hours PRN agitation  haloperidol    Injectable 5 milliGRAM(s) IntraMuscular every 6 hours PRN severe agitation  LORazepam     Tablet 2 milliGRAM(s) Oral every 6 hours PRN Agitation  LORazepam     Tablet 1 milliGRAM(s) Oral at bedtime PRN anxiety/insomnia/schizoaffective disorder  LORazepam   Injectable 2 milliGRAM(s) IntraMuscular once PRN severe agitation  traZODone 50 milliGRAM(s) Oral at bedtime PRN insomnia  
MEDICATIONS  (STANDING):  gabapentin 300 milliGRAM(s) Oral at bedtime  lithium CR (ESKALITH-CR) 1125 milliGRAM(s) Oral at bedtime  risperiDONE   Tablet 1 milliGRAM(s) Oral at bedtime  venlafaxine XR 75 milliGRAM(s) Oral daily    MEDICATIONS  (PRN):  diphenhydrAMINE 50 milliGRAM(s) Oral every 6 hours PRN Agitation/EPS  diphenhydrAMINE   Injectable 50 milliGRAM(s) IntraMuscular every 6 hours PRN severe agitation  gabapentin 300 milliGRAM(s) Oral four times a day PRN anxiety  haloperidol     Tablet 5 milliGRAM(s) Oral every 6 hours PRN agitation  haloperidol    Injectable 5 milliGRAM(s) IntraMuscular every 6 hours PRN severe agitation  LORazepam     Tablet 2 milliGRAM(s) Oral every 6 hours PRN Agitation  LORazepam   Injectable 2 milliGRAM(s) IntraMuscular once PRN severe agitation  traZODone 50 milliGRAM(s) Oral at bedtime PRN insomnia  
MEDICATIONS  (STANDING):  gabapentin 300 milliGRAM(s) Oral at bedtime  lithium CR (ESKALITH-CR) 900 milliGRAM(s) Oral at bedtime  risperiDONE   Tablet 3 milliGRAM(s) Oral at bedtime    MEDICATIONS  (PRN):  diphenhydrAMINE 50 milliGRAM(s) Oral every 6 hours PRN Agitation/EPS  diphenhydrAMINE   Injectable 50 milliGRAM(s) IntraMuscular every 6 hours PRN severe agitation  gabapentin 300 milliGRAM(s) Oral four times a day PRN anxiety  haloperidol     Tablet 5 milliGRAM(s) Oral every 6 hours PRN agitation  haloperidol    Injectable 5 milliGRAM(s) IntraMuscular every 6 hours PRN severe agitation  LORazepam     Tablet 2 milliGRAM(s) Oral every 6 hours PRN Agitation  LORazepam     Tablet 1 milliGRAM(s) Oral at bedtime PRN anxiety/insomnia/schizoaffective disorder  LORazepam   Injectable 2 milliGRAM(s) IntraMuscular once PRN severe agitation  traZODone 50 milliGRAM(s) Oral at bedtime PRN insomnia  
MEDICATIONS  (STANDING):  gabapentin 300 milliGRAM(s) Oral at bedtime  lithium CR (ESKALITH-CR) 1125 milliGRAM(s) Oral at bedtime  venlafaxine XR 75 milliGRAM(s) Oral daily    MEDICATIONS  (PRN):  diphenhydrAMINE 50 milliGRAM(s) Oral every 6 hours PRN Agitation/EPS  diphenhydrAMINE   Injectable 50 milliGRAM(s) IntraMuscular every 6 hours PRN severe agitation  gabapentin 300 milliGRAM(s) Oral four times a day PRN anxiety  haloperidol     Tablet 5 milliGRAM(s) Oral every 6 hours PRN agitation  haloperidol    Injectable 5 milliGRAM(s) IntraMuscular every 6 hours PRN severe agitation  LORazepam     Tablet 2 milliGRAM(s) Oral every 6 hours PRN Agitation  LORazepam   Injectable 2 milliGRAM(s) IntraMuscular once PRN severe agitation  traZODone 50 milliGRAM(s) Oral at bedtime PRN insomnia  
MEDICATIONS  (STANDING):  LORazepam     Tablet 1 milliGRAM(s) Oral at bedtime  risperiDONE   Tablet 2 milliGRAM(s) Oral at bedtime  traZODone 50 milliGRAM(s) Oral at bedtime    MEDICATIONS  (PRN):  diphenhydrAMINE 50 milliGRAM(s) Oral every 6 hours PRN Agitation/EPS  diphenhydrAMINE   Injectable 50 milliGRAM(s) IntraMuscular every 6 hours PRN severe agitation  haloperidol     Tablet 5 milliGRAM(s) Oral every 6 hours PRN agitation  haloperidol    Injectable 5 milliGRAM(s) IntraMuscular every 6 hours PRN severe agitation  hydrOXYzine hydrochloride 50 milliGRAM(s) Oral every 6 hours PRN anxiety  LORazepam     Tablet 2 milliGRAM(s) Oral every 6 hours PRN Agitation  LORazepam   Injectable 1 milliGRAM(s) IntraMuscular once PRN severe agitation  
MEDICATIONS  (STANDING):  gabapentin 300 milliGRAM(s) Oral at bedtime  lithium CR (ESKALITH-CR) 900 milliGRAM(s) Oral at bedtime  risperiDONE   Tablet 3 milliGRAM(s) Oral at bedtime    MEDICATIONS  (PRN):  diphenhydrAMINE 50 milliGRAM(s) Oral every 6 hours PRN Agitation/EPS  diphenhydrAMINE   Injectable 50 milliGRAM(s) IntraMuscular every 6 hours PRN severe agitation  gabapentin 300 milliGRAM(s) Oral four times a day PRN anxiety  haloperidol     Tablet 5 milliGRAM(s) Oral every 6 hours PRN agitation  haloperidol    Injectable 5 milliGRAM(s) IntraMuscular every 6 hours PRN severe agitation  LORazepam     Tablet 2 milliGRAM(s) Oral every 6 hours PRN Agitation  LORazepam     Tablet 1 milliGRAM(s) Oral at bedtime PRN anxiety/insomnia/schizoaffective disorder  LORazepam   Injectable 2 milliGRAM(s) IntraMuscular once PRN severe agitation  traZODone 50 milliGRAM(s) Oral at bedtime PRN insomnia  
MEDICATIONS  (STANDING):  gabapentin 300 milliGRAM(s) Oral at bedtime  lithium CR (ESKALITH-CR) 1125 milliGRAM(s) Oral at bedtime  risperiDONE   Tablet 2 milliGRAM(s) Oral at bedtime  venlafaxine XR 75 milliGRAM(s) Oral daily    MEDICATIONS  (PRN):  diphenhydrAMINE 50 milliGRAM(s) Oral every 6 hours PRN Agitation/EPS  diphenhydrAMINE   Injectable 50 milliGRAM(s) IntraMuscular every 6 hours PRN severe agitation  gabapentin 300 milliGRAM(s) Oral four times a day PRN anxiety  haloperidol     Tablet 5 milliGRAM(s) Oral every 6 hours PRN agitation  haloperidol    Injectable 5 milliGRAM(s) IntraMuscular every 6 hours PRN severe agitation  LORazepam     Tablet 2 milliGRAM(s) Oral every 6 hours PRN Agitation  LORazepam   Injectable 2 milliGRAM(s) IntraMuscular once PRN severe agitation  traZODone 50 milliGRAM(s) Oral at bedtime PRN insomnia  
MEDICATIONS  (STANDING):  gabapentin 300 milliGRAM(s) Oral at bedtime  lithium CR (ESKALITH-CR) 1125 milliGRAM(s) Oral at bedtime  venlafaxine XR 75 milliGRAM(s) Oral daily    MEDICATIONS  (PRN):  diphenhydrAMINE 50 milliGRAM(s) Oral every 6 hours PRN Agitation/EPS  diphenhydrAMINE   Injectable 50 milliGRAM(s) IntraMuscular every 6 hours PRN severe agitation  gabapentin 300 milliGRAM(s) Oral four times a day PRN anxiety  haloperidol     Tablet 5 milliGRAM(s) Oral every 6 hours PRN agitation  haloperidol    Injectable 5 milliGRAM(s) IntraMuscular every 6 hours PRN severe agitation  LORazepam     Tablet 2 milliGRAM(s) Oral every 6 hours PRN Agitation  LORazepam   Injectable 2 milliGRAM(s) IntraMuscular once PRN severe agitation  traZODone 50 milliGRAM(s) Oral at bedtime PRN insomnia

## 2021-07-20 NOTE — BH INPATIENT PSYCHIATRY DISCHARGE NOTE - OTHER PAST PSYCHIATRIC HISTORY (INCLUDE DETAILS REGARDING ONSET, COURSE OF ILLNESS, INPATIENT/OUTPATIENT TREATMENT)
As per ED: pt is a 26 y/o male, domiciled w/ parents and siblings in [facility], previously working a temp job in , single, no dependants, reported history of schizophrenia vs schizoaffective disorder bipolar type, no pertinent PMH, tested COVID-19 + in May 2021 (completed Moderna series last month) NKDA, denies any SA or self-injurious behavior, family denies history of violence or aggression, 2 prior inpatient psych admissions, 1st in 2017, last in 5/12-25/2021 at [facility], in outpatient psychiatric tx at [facility], denies substance use (but reports eating CBD gummies)  reportedly last had Invega FORD on 5/25/2021, who was brought to the ED via EMS activated by patient who called 911 due to suicidal ideation in setting of missing dose of FORD.     Writer and Psych Rehab staff met with patient who presented as thought blocked, with latency of speech, some disorganized thought, though largely cooperative and engaged overall. He corroborated his report from the emergency room, and stated he is having a "Shakespearean experience" looking for his [place]. 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 he denies current SI/HI and AVH, though admits that in the past he has been profoundly paranoid and thought that a cult was after him.

## 2021-07-20 NOTE — BH INPATIENT PSYCHIATRY DISCHARGE NOTE - NSBHDCHANDOFFFT_PSY_ALL_CORE
writer will signout case via phone Signout via email to Joycelyn Dockery and Susanne via DC summary document.

## 2021-07-20 NOTE — BH INPATIENT PSYCHIATRY DISCHARGE NOTE - NSBHDCSIGEVENTSFT_PSY_A_CORE
Continued from above:    DISCHARGE PLAN  1)	Pt stable for DC to home with Rx meds via [facility];  2)	Psychopharm, as below:    All Active Home Medications at time of Discharge Reconciliation: 19-Jul-2021 20:56    Invega Sustenna 156 mg/mL intramuscular suspension, extended release 156 milligram(s) intramuscularly every 4 weeks; given 7/14/21; NEXT DUE: 8/11/21     lithium 450 mg oral tablet, extended release 2.5 tab(s) orally once a day (at bedtime)      Pristiq 50 mg oral tablet, extended release 1 tab(s) orally once a day (at bedtime)      3)	Long Acting Injectible medication (FORD): Invega Sustenna 156 mg/mL intramuscular suspension, extended release 156 milligram(s) intramuscularly every 4 weeks; given 7/14/21; NEXT DUE: 8/11/21  PO bridging: currently none, PO Risperdal stopped  4) Lithium levels pending. Resends levels PRN as outpatient.  5) Pristiq given for mood/anxiety and weight loss, ideally to offset weight gain from invega- pt noted hx of weight gain on invega and/or sustenna FORD.  6) Labs:  Basic labs as per [facility] Wilson;  utox NEG; RLm=174;  metabolic monitoring while on invega and/or FORD.  Covid NEG but Jr=666;  Resend lithium=0.6 levels ideally as PM trough and BMP at next visit. Periodic TFTs q 3-6 mos while on lithium.  7) Pt would benefit from continued psychotherapy including socialization group.  8) NUT follow up while on SGA/FORD and encourage exercise.  9) Mother a very important support (pt resides with mother)- encourage family meeting and education of supports.  10) Consider PROS type program referral? Pt may benefit from some form of vocational referral such as [employer] or similar but wishes to attempt to return to work on his initiative.  11) PCP f/u with MD prn for medical comorbids/metabolic monitoring.  12) Sleep: no additional sleep medication; suggest all lithium and pristiq as QHS schedule.  13) Substance use: cannabis use disorder; MI given  14) PAIN mgmt.: none  15) Smoking: none known  16) Case management: ?    Additional discharge-related matters:  All Rx meds given for 30 days, no refills- unless otherwise indicated.  Controlled substances given for 30 days MAX, unless otherwise indicated.  DUE DATES for all Juan indicated in DISCHARGE PLAN section. Any questions about FORD/proposed future due dates, please call attending, as below.  Writer and inpatient team can be reached at [place] [address] unit M-F at: [phone] (can request MD and/or leave message with [facility] Unit receptionist).

## 2021-07-20 NOTE — BH INPATIENT PSYCHIATRY PROGRESS NOTE - NSTXSUBMISINTERMD_PSY_ALL_CORE
psychopharm with supportive therapy and group therapy and goal of FORD resumption

## 2021-07-20 NOTE — BH INPATIENT PSYCHIATRY PROGRESS NOTE - NSTXPROBDCOPNO_PSY_ALL_CORE
DISCHARGE ISSUE - NON-ADHERENT WITH OUTPATIENT SERVICES

## 2021-07-20 NOTE — BH INPATIENT PSYCHIATRY PROGRESS NOTE - NSTXPROBNEGAT_PSY_ALL_CORE
NEGATIVE SELF-TALK

## 2021-07-20 NOTE — BH INPATIENT PSYCHIATRY DISCHARGE NOTE - HPI (INCLUDE ILLNESS QUALITY, SEVERITY, DURATION, TIMING, CONTEXT, MODIFYING FACTORS, ASSOCIATED SIGNS AND SYMPTOMS)
Patient was seen and evaluated, chart reviewed. Case discussed with nursing team.  On service for this 25 year old  male with PPH of Schizoaffective Disorder Bipolar type.  Patient is hospitalized with a primary problem of worsening mood, with suicidal ideations. Patient admitted to [facility] on a 9.39 legal status. I have reviewed the initial psychiatric assessment in the electronic medical record, including the history of present illness, past psychiatric history, family/social history (no pertinent changes), and exam, and have confirmed the salient findings dated 21.  As per chart review, transferring records indicated the followin y/o male, domiciled w/ parents and sister in [facility], previously working a temp job in , single, no dependants, reported history of schizophrenia vs schizoaffective disorder bipolar type, no pertinent PMH, tested COVID-19 + in May 2021 (completed Moderna series last month) NKDA, denies any SA or self-injurious behavior, family denies history of violence or aggression, 2 prior inpatient psych admissions, 1st in , last in - at [facility], in outpatient psychiatric tx at [facility], denies substance use (but reports eating CBD gummies)  reportedly last had Invega FORD on 2021, who was brought to the ED via EMS activated by patient who called 911 due to suicidal ideation in setting of missing dose of FORD.   On interview, pt was calm and cooperative though overtly thought disordered. Pt reports that he has been having difficulty sleeping, his mind is "racing," he was Tweeting profusely, he has been feeling depressed because he has not found his "Gladys" (he has been single for 5 years). He states that he is "paleosexual" which he defines as an intellectual sexual person. He states that he wants to find a "wife" that cooks and wishes that he could be sexually active (but reports that it is unlikely to find someone who "understands" him). He states that he called 911 because he was thinking about "ending it all" and was thinking about ending his life. Reports thoughts of wishing he was dead currently, but denies intent or plan (he identified suicidal ideation as "feeling light blue." He denies any prior history of SA. He believes it is possible that he has bipolar disorder and that he previously was on Risperdal but has been on Invega for the last 2 years, which he does not believe he needs. He reports history of AVH which he perceives as "loud music" and "notes." He states that this is keeping him up at night.  He reports using CBD products regularly, which he states that makes the music "louder" stated, "weed is loud." He denies using substances (other than 1 beer earlier today) but states that he feels "[place]" from 2nd hand smoke is causing his voices. Of note, patient states that he at times stops taking his Invega because he is nervous about weight gain. (He previously weight 370 pounds and lost over 100 pounds. HE stated that he was able to lose weight on both Invega and off of Invega, but that he stops this medication due to concern for weight gain. He states that he would be open to trying new medication or changing the formulation of Invega to Trinza, though he feels that his Invega at times doesn't "hold" him, though he missed his dose 2 weeks ago.   Collateral information obtained from pt's mother Ronna Johnson ([phone]). Report that pt has a history of bipolar disorder and schizophrenia and receives his outpt psychiatric treatment at [facility] but recently changed psychiatrists. He had gone to [place] today and family found it strange he told them he was going to go to [place] later to watch the  [employer]. They were unaware pt was in the ED, but noted that pt calls the ED when he is "distressed." Pt has a history of medication nonadherence, he had been off of his FORD since 2020 until May 2021. Pt had been doing "okay" since his last inpatient psychiatric hospitalization, had lost weight and had been exercising. Report pt does not have a history of SI or SA, no history of aggression or violence. Updated family of treatment plan.    On unit:  Information Received From: Chart review and patient interview    Patient is followed up for depression and psychosis.  Chart, medications and labs reviewed.  Patient is discussed with nursing staff. No significant overnight issues.      Patient is observed in his room but agrees to interview.  When questioned about his mood patient reports feeling “ I spent most of the day diving deeply into my mood.”  Patient admits to low mood. Denies SI, anxiety or jasmin.  Reports prior to admission he was feeling very depressed and suicidal , SI started 2 days ago reports “I started feeling more Brian.” Reports poor sleep for 2 days,  racing thoughts, more psychotic symptoms. Endorses +AH, IOR “the music is talking to me an then I do what it says” unable to elaborate what sort of things. Regarding AH pt reports hearing the same things over and over, he describes it as “interlooping thoughts, thoughts on top of thoughts on top of thoughts.”    Patient presents intermally preoccupied, disorganized with ongoing perceptual disturbances. Denies drug use states he is allergic to cannabis, reports occasional alcohol, last drank two days ago, reports 30 ounces of liquor. Denies any w/d symptoms, no hx of sx w/d, u-tox negative. Reports medication trial with Invega FORD, “it didn’t work for me” and Risperdal.   No mention of sleep disturbances or appetite concerns om the unit.  Remains compliant with standing medications.  The need for medication compliance is emphasized.  Self- care remains fair.  No acute medical concerns, denies any pain. VSS. Continue to provide therapeutic support.

## 2021-07-20 NOTE — BH INPATIENT PSYCHIATRY PROGRESS NOTE - OTHER
apathetic vs negative symptoms, further attenuated, engaged in discussion re instruments KONG and BDA, tolerated discussion well but unable to define anxiety; now given numerous other instruments and completed DES2 dissociation scale today, stable for DC to home mild alogia but difficulty "finding the words to put on my thoughts" and feelings Pt narrative some paucity of ideas evident vague, instant parsing of proverbs mild PMR less apathetic, brighter affect emerging

## 2021-07-20 NOTE — BH INPATIENT PSYCHIATRY PROGRESS NOTE - NSTXDCHOUSGOAL_PSY_ALL_CORE
Will meet with care coordinator and accept services

## 2021-07-20 NOTE — BH INPATIENT PSYCHIATRY DISCHARGE NOTE - REASON FOR ADMISSION
04/09/20        [address]  [address] [facility] [address]      Dear Riky Shelton records indicate that you have outstanding lab work and or testing that was ordered for you and has not yet been completed:  Orders Placed This En
Psychosis with depressed mood and anxiety

## 2021-07-20 NOTE — BH INPATIENT PSYCHIATRY PROGRESS NOTE - NSBHMSETHTCONTENT_PSY_A_CORE
Unremarkable
Unremarkable/Other
Unremarkable
Unremarkable/Other

## 2021-07-20 NOTE — BH INPATIENT PSYCHIATRY PROGRESS NOTE - NSBHMSETHTPROC_PSY_A_CORE
Circumstantial/Perseverative/Other
Circumstantial/Perseverative/Other
Circumstantial/Other
Circumstantial/Other
Circumstantial/Perseverative/Other
Circumstantial/Other
Circumstantial/Perseverative/Other
Circumstantial/Perseverative/Other

## 2021-07-20 NOTE — BH INPATIENT PSYCHIATRY PROGRESS NOTE - NSBHMSEBEHAV_PSY_A_CORE
Cooperative/Other
Cooperative
Cooperative
Cooperative/Other
Cooperative/Other

## 2021-07-20 NOTE — BH INPATIENT PSYCHIATRY PROGRESS NOTE - NSBHCONSBHPROVCNTCTNOFT_PSY_A_CORE
Defer to primary team

## 2021-07-20 NOTE — BH INPATIENT PSYCHIATRY PROGRESS NOTE - NSBHCHARTREVIEWVS_PSY_A_CORE FT
Vital Signs Last 24 Hrs  T(C): 36.3 (16 Jul 2021 08:22), Max: 36.4 (15 Jul 2021 20:58)  T(F): 97.3 (16 Jul 2021 08:22), Max: 97.6 (15 Jul 2021 20:58)  HR: 76 (16 Jul 2021 08:22) (76 - 76)  BP: 111/62 (16 Jul 2021 08:22) (111/62 - 111/62)  BP(mean): --  RR: --  SpO2: --
Vital Signs Last 24 Hrs  T(C): 36.7 (19 Jul 2021 18:45), Max: 36.7 (19 Jul 2021 18:45)  T(F): 98 (19 Jul 2021 18:45), Max: 98 (19 Jul 2021 18:45)  HR: 72 (19 Jul 2021 06:27) (72 - 72)  BP: 122/62 (19 Jul 2021 06:27) (122/62 - 122/62)  BP(mean): --  RR: 20 (19 Jul 2021 06:27) (20 - 20)  SpO2: --
Vital Signs Last 24 Hrs  T(C): 36.4 (13 Jul 2021 20:41), Max: 36.4 (13 Jul 2021 08:05)  T(F): 97.6 (13 Jul 2021 20:41), Max: 97.6 (13 Jul 2021 08:05)  HR: --  BP: --  BP(mean): --  RR: --  SpO2: --
Vital Signs Last 24 Hrs  T(C): 36.2 (06 Jul 2021 18:33), Max: 36.4 (06 Jul 2021 06:56)  T(F): 97.2 (06 Jul 2021 18:33), Max: 97.6 (06 Jul 2021 06:56)  HR: --  BP: --  BP(mean): --  RR: --  SpO2: --
Vital Signs Last 24 Hrs  T(C): 36.7 (12 Jul 2021 08:27), Max: 36.8 (11 Jul 2021 21:06)  T(F): 98 (12 Jul 2021 08:27), Max: 98.2 (11 Jul 2021 21:06)  HR: 69 (12 Jul 2021 08:27) (69 - 69)  BP: 108/71 (12 Jul 2021 08:27) (108/71 - 108/71)  BP(mean): --  RR: --  SpO2: --
Vital Signs Last 24 Hrs  T(C): 36.4 (20 Jul 2021 06:19), Max: 36.4 (20 Jul 2021 06:19)  T(F): 97.6 (20 Jul 2021 06:19), Max: 97.6 (20 Jul 2021 06:19)  HR: 62 (20 Jul 2021 06:19) (62 - 62)  BP: 121/72 (20 Jul 2021 06:19) (121/72 - 121/72)  BP(mean): --  RR: --  SpO2: --
Vital Signs Last 24 Hrs  T(C): 36.4 (08 Jul 2021 20:34), Max: 36.4 (08 Jul 2021 08:18)  T(F): 97.6 (08 Jul 2021 20:34), Max: 97.6 (08 Jul 2021 20:34)  HR: --  BP: --  BP(mean): --  RR: --  SpO2: --
Vital Signs Last 24 Hrs  T(C): 36.3 (07 Jul 2021 06:16), Max: 36.3 (07 Jul 2021 06:16)  T(F): 97.3 (07 Jul 2021 06:16), Max: 97.3 (07 Jul 2021 06:16)  HR: 96 (07 Jul 2021 06:16) (96 - 96)  BP: 101/58 (07 Jul 2021 06:16) (101/58 - 101/58)  BP(mean): --  RR: --  SpO2: --
Vital Signs Last 24 Hrs  T(C): 36.5 (15 Jul 2021 08:13), Max: 36.5 (14 Jul 2021 20:44)  T(F): 97.7 (15 Jul 2021 08:13), Max: 97.7 (14 Jul 2021 20:44)  HR: --  BP: 107/72 (15 Jul 2021 06:54) (107/72 - 107/72)  BP(mean): 68 (15 Jul 2021 06:54) (68 - 68)  RR: 16 (15 Jul 2021 08:13) (16 - 18)  SpO2: --
Vital Signs Last 24 Hrs  T(C): 36.8 (11 Jul 2021 21:06), Max: 36.8 (11 Jul 2021 21:06)  T(F): 98.2 (11 Jul 2021 21:06), Max: 98.2 (11 Jul 2021 21:06)  HR: 77 (11 Jul 2021 08:56) (77 - 77)  BP: 126/68 (11 Jul 2021 08:56) (126/68 - 126/68)  BP(mean): --  RR: --  SpO2: --
Vital Signs Last 24 Hrs  T(C): 36.4 (15 Jul 2021 20:58), Max: 36.5 (15 Jul 2021 06:54)  T(F): 97.6 (15 Jul 2021 20:58), Max: 97.7 (15 Jul 2021 06:54)  HR: --  BP: 107/72 (15 Jul 2021 06:54) (107/72 - 107/72)  BP(mean): 68 (15 Jul 2021 06:54) (68 - 68)  RR: 16 (15 Jul 2021 08:13) (16 - 18)  SpO2: --

## 2021-07-20 NOTE — BH INPATIENT PSYCHIATRY PROGRESS NOTE - NSTXMEDICDATEEST_PSY_ALL_CORE
06-Jul-2021
05-Jul-2021
05-Jul-2021
06-Jul-2021
06-Jul-2021
05-Jul-2021
06-Jul-2021
05-Jul-2021

## 2021-07-20 NOTE — BH INPATIENT PSYCHIATRY PROGRESS NOTE - NSTXPSYCHOINTERMD_PSY_ALL_CORE
psychopharm with supportive therapy and group therapy and goal of FORD resumption

## 2021-07-20 NOTE — BH INPATIENT PSYCHIATRY PROGRESS NOTE - NSICDXBHPRIMARYDX_PSY_ALL_CORE
Schizoaffective disorder   F25.9  

## 2021-07-20 NOTE — BH INPATIENT PSYCHIATRY PROGRESS NOTE - NSTXDEPRESINTERMD_PSY_ALL_CORE
psychopharm with supportive therapy and group therapy and goal of FORD resumption
psychopharm with supportive therapy and group therapy and goal of FORD resumption
51
psychopharm with supportive therapy and group therapy and goal of FORD resumption
psychopharm with supportive therapy and group therapy and goal of FORD resumption

## 2021-07-20 NOTE — BH INPATIENT PSYCHIATRY PROGRESS NOTE - NSTXMEDICDATENEW_PSY_ALL_CORE
06-Jul-2021

## 2021-07-20 NOTE — BH INPATIENT PSYCHIATRY DISCHARGE NOTE - HOSPITAL COURSE
To be updated by attending CLINICAL COURSE  VOL Admit dates on [facility]: 7/5/21 to 7/20/21  Pt arrived to the [facility] unit in the above context. Alex arrived to the unit mildly disorganized with FTD with recent AH but also limited insight about his illness- hx of BAD vs SAD vs SCZ. He did think the diagnosis bipolar was correct but yet on screening significant bipolar symptoms were not easily identified, but mood did appear depressed, even apathetic (r/o negative symptoms). He showed significant issues with linear thinking and word finding and some concreteness but yet was able to parse proverbs rather rapidly. He had been on invega Sustenna previously/recently but in recent months he stopped it because what he felt was nearly 100 pounds of weight gain- but even this remained unclear if 2/2 invega vs anxiety or poor diet and exercise habits. Team considered other options for SGAs and Juan but patient appeared to wish to restart Invega Sustenna FORD (team clarified several times to insure pt in fact wished to continue this FORD) , consequently (less than six months) FORD interruption protocol was implemented with two doses of 156 mg IMs seven days apart. Initially patient was continued on Risperdal 2 milligrams nightly initiated in the ED and this was increased to 3 mg nightly to good effect until Juan began to take old. Team also opted to add Effexor for mood given apathy and depressed mood and anxiety but also to offset any weight gain from invega Sustenna. Patient was encouraged to maintain proper diet and exercise going forwards. Pt was also given MI and discouraged from any further use of cannabinoids as possible triggers for mood and/or psychotic episodes- and had not been aware of this fact. Second hand smoke was explained to not be the likely culprit (see above). Initially patient had been apathetic and remained in bed for long periods during days, which also may have been related to cannabinoid use. He was not engaged in group and milieu therapy however slowly this began to change and patient began to engage groups and peers on the unit. He stated he had attended a partial program recently and also wished to find new work in  so consequently he did not wish to attend a partial program again in the near-term. A day treatment program also was also not deemed feasible for this reason. He felt he did better in one to one sessions with a therapist and preferred that arrangement. A family meeting was scheduled with mother and patient and treatment team once patient showed further improvements and consented. Mother appeared very supportive but noted that at times patient was not communicative such as when he disappeared from home and went to the emergency room prompting admission (pt stated he did not feel understood by his family). He was encouraged to engage more with his outpatient treatment and continue his FORD which he felt did improve his functioning, also to stay focused on that latter point as the main indicator for compliance. Patient was deemed stable for discharge and diagnosis did seem consistent at times with bipolar spectrum illness and schizophrenia spectrum illness both, ie, likely most consistent with SAD. Depressed mood had showed much improvement and all SI resolved, and patient denied any wish for self harm or harm of others with no clarke psychosis or AVTH evident, or jasmin or depression and anxiety much attenuated. Thinking was linear and goal directed and appropriate with no TB or apparent FTD. Prescription medication was filled as below via [facility] and patient was discharged to home  No acute MED issues during stay. Covid POS in May, recently  completed moderna covid vaccines x2.  Please contact Dr. Santillan/Nicolette Attending and Unit Chief, if any questions: [phone] or [email or web address]  See DISCHARGE PLAN and Rx meds at discharge, below.    MSE  See final progress note 7/20/21 for MSE at discharge.    DSM5 DD  SAD, BAD type  R/o BAD  Cannabinoid use disorder  Anxiety disorder NOS      Continued below.

## 2021-07-20 NOTE — BH INPATIENT PSYCHIATRY PROGRESS NOTE - NSBHMSESPABN_PSY_A_CORE
Soft volume/Slowed rate
Soft volume/Slowed rate
Impaired articulation
Other
Soft volume/Slowed rate
Soft volume/Slowed rate
Other
Soft volume/Slowed rate

## 2021-07-20 NOTE — BH INPATIENT PSYCHIATRY PROGRESS NOTE - NSTXIMPULSGOAL_PSY_ALL_CORE
Will be able to demonstrate the ability to pause before acting out negatively
Will be able to describe/demonstrate alternative ways of managing his/her emotional state on the unit
Will be able to demonstrate the ability to pause before acting out negatively
Will be able to recognize 2+ triggers
Will be able to describe/demonstrate alternative ways of managing his/her emotional state on the unit

## 2021-07-20 NOTE — BH INPATIENT PSYCHIATRY PROGRESS NOTE - NSTXDCOPNODATETRGT_PSY_ALL_CORE
13-Jul-2021

## 2021-07-20 NOTE — BH INPATIENT PSYCHIATRY PROGRESS NOTE - NSTXANXINTERMD_PSY_ALL_CORE
psychopharm with supportive therapy and group therapy and goal of FORD resumption
psychopharm with supportive therapy and group therapy and goal of FORD resumption
No
psychopharm with supportive therapy and group therapy and goal of FORD resumption
psychopharm with supportive therapy and group therapy and goal of FORD resumption

## 2021-07-20 NOTE — BH INPATIENT PSYCHIATRY PROGRESS NOTE - NSBHINPTBILLING_PSY_ALL_CORE
16603 - Inpatient Moderate Complexity
14957 - Inpatient Moderate Complexity
35795 - Hospital Discharge Day Management; more than 30 min
03369 - Inpatient Moderate Complexity
15179 - Inpatient Moderate Complexity
41384 - Inpatient Moderate Complexity
06914 - Inpatient Moderate Complexity
05678 - Inpatient Moderate Complexity
Time based billing
13318 - Inpatient Moderate Complexity
18754 - Inpatient Moderate Complexity

## 2021-07-20 NOTE — BH INPATIENT PSYCHIATRY PROGRESS NOTE - NSTXANXGOAL_PSY_ALL_CORE
Be able to participate in activities despite lingering anxiety/panic
Identify and practice 3 coping skills to manage anxiety
Be able to participate in activities despite lingering anxiety/panic
Identify and practice 3 coping skills to manage anxiety
Be able to participate in activities despite lingering anxiety/panic
Be able to participate in activities despite lingering anxiety/panic

## 2021-07-20 NOTE — BH INPATIENT PSYCHIATRY PROGRESS NOTE - NSDCCRITERIA_PSY_ALL_CORE
Symptom stabilization  CGI<=2

## 2021-07-20 NOTE — BH INPATIENT PSYCHIATRY PROGRESS NOTE - NSTXSUICIDDATEEST_PSY_ALL_CORE
05-Jul-2021

## 2021-07-20 NOTE — BH INPATIENT PSYCHIATRY PROGRESS NOTE - NSBHATTESTSEENBY_PSY_A_CORE
attending Psychiatrist without NP/Trainee
attending Psychiatrist without NP/Trainee
NP without Attending Psychiatrist
attending Psychiatrist without NP/Trainee
NP without Attending Psychiatrist
attending Psychiatrist without NP/Trainee

## 2021-07-20 NOTE — BH INPATIENT PSYCHIATRY PROGRESS NOTE - NSTXNEGATGOAL_PSY_ALL_CORE
Will seek support from staff when upset by negative self-talk
Will seek support from staff when upset by negative self-talk
Will be able to demonstrate understanding of self-talk and its relationship to self-image and communication through discussion with staff once a day
Will seek support from staff when upset by negative self-talk
Will be able to identify and utilize affirmations to create positive self-talk
Will be able to demonstrate understanding of self-talk and its relationship to self-image and communication through discussion with staff once a day
Will seek support from staff when upset by negative self-talk
Will seek support from staff when upset by negative self-talk

## 2021-07-20 NOTE — BH INPATIENT PSYCHIATRY PROGRESS NOTE - NSTXPSYCHOGOAL_PSY_ALL_CORE
Will report command hallucinations to staff
Will report using relaxation skills 3 times a day to reduce anxiety about delusions/hallucinations
Will report command hallucinations to staff
Will identify 1 trigger/stressor that exacerbates hallucinations
Will report command hallucinations to staff
Will report command hallucinations to staff
Will report using relaxation skills 3 times a day to reduce anxiety about delusions/hallucinations
Will report command hallucinations to staff
Will report command hallucinations to staff

## 2021-07-20 NOTE — BH INPATIENT PSYCHIATRY PROGRESS NOTE - NSBHMSESPEECH_PSY_A_CORE
Abnormal as indicated, otherwise normal...

## 2021-07-20 NOTE — BH INPATIENT PSYCHIATRY PROGRESS NOTE - NSBHASSESSSUMMFT_PSY_ALL_CORE
Plan:   >Legal: 9.39  >Obs: Routine, no current SI. no need for CO, patient not expected to pose risk to self or others in controlled inpatient setting  >Psychiatric Meds: Restart outpatient medication regimen: Risperdal 1mg, titrate as tolerated. Observe for tolerability and efficacy. Patient had been poorly adherent prior to admission.    PRN medications:  Ativan 2mg oral Q6HR PRN for agitation and anxiety.  Haldol 5mg oral Q6HR PRN for agitation.   Benadryl 50mg oral Q6HR PRN for agitation.   Vistaril 50mg oral Q6HR PRN for anxiety.  Desyrel 50mg oral QHS PRN for insomnia.     >Labs: Admission labs reviewed, no acute findings. labs pending for tomorrow: A1c and lipid level. Hold antipsychotics if QTc >500  >Medical:   No acute concerns. No consultations needed at this time. No indication for CIWA. Patient with consistently stable VS, no visible physical symptoms of  withdrawal. During the course of treatment, will collaborate with medical team to manage medical issues.  >Diet: Regular  >Social: milieu/structured therapy  >Treatment Interventions: Groups and Individual Therapy/CBT, Motivational counseling for substance abuse related issues.   >Dispo: Collateral and dispo planning pending further symptom and medication optimization      
Plan:   >Legal: 9.39  >Obs: Routine, no current SI. no need for CO, patient not expected to pose risk to self or others in controlled inpatient setting  >Psychiatric Meds: Received Invega sustenna 156 mg IM Q 4 weeks on 7/14/21  PRN medications:  Ativan 2mg oral Q6HR PRN for agitation and anxiety.  Haldol 5mg oral Q6HR PRN for agitation.   Benadryl 50mg oral Q6HR PRN for agitation.   Vistaril 50mg oral Q6HR PRN for anxiety.  Desyrel 50mg oral QHS PRN for insomnia.     >Labs: Admission labs reviewed, no acute findings. labs pending for tomorrow: A1c and lipid level. Hold antipsychotics if QTc >500  >Medical:   No acute concerns. No consultations needed at this time. No indication for CIWA. Patient with consistently stable VS, no visible physical symptoms of  withdrawal. During the course of treatment, will collaborate with medical team to manage medical issues.  >Diet: Regular  >Social: milieu/structured therapy  >Treatment Interventions: Groups and Individual Therapy/CBT, Motivational counseling for substance abuse related issues.   >Dispo: Collateral and dispo planning pending further symptom and medication optimization      
Plan:   >Legal: 9.39  >Obs: Routine, no current SI. no need for CO, patient not expected to pose risk to self or others in controlled inpatient setting  >Psychiatric Meds: Restart outpatient medication regimen: Risperdal 1mg, titrate as tolerated. Observe for tolerability and efficacy. Patient had been poorly adherent prior to admission.    PRN medications:  Ativan 2mg oral Q6HR PRN for agitation and anxiety.  Haldol 5mg oral Q6HR PRN for agitation.   Benadryl 50mg oral Q6HR PRN for agitation.   Vistaril 50mg oral Q6HR PRN for anxiety.  Desyrel 50mg oral QHS PRN for insomnia.     >Labs: Admission labs reviewed, no acute findings. labs pending for tomorrow: A1c and lipid level. Hold antipsychotics if QTc >500  >Medical:   No acute concerns. No consultations needed at this time. No indication for CIWA. Patient with consistently stable VS, no visible physical symptoms of  withdrawal. During the course of treatment, will collaborate with medical team to manage medical issues.  >Diet: Regular  >Social: milieu/structured therapy  >Treatment Interventions: Groups and Individual Therapy/CBT, Motivational counseling for substance abuse related issues.   >Dispo: Collateral and dispo planning pending further symptom and medication optimization      
Plan:   >Legal: 9.39  >Obs: Routine, no current SI. no need for CO, patient not expected to pose risk to self or others in controlled inpatient setting  >Psychiatric Meds: Received Invega sustenna 156 mg IM Q 4 weeks on 7/14/21  PRN medications:  Ativan 2mg oral Q6HR PRN for agitation and anxiety.  Haldol 5mg oral Q6HR PRN for agitation.   Benadryl 50mg oral Q6HR PRN for agitation.   Vistaril 50mg oral Q6HR PRN for anxiety.  Desyrel 50mg oral QHS PRN for insomnia.     >Labs: Admission labs reviewed, no acute findings. labs pending for tomorrow: A1c and lipid level. Hold antipsychotics if QTc >500  >Medical:   No acute concerns. No consultations needed at this time. No indication for CIWA. Patient with consistently stable VS, no visible physical symptoms of  withdrawal. During the course of treatment, will collaborate with medical team to manage medical issues.  >Diet: Regular  >Social: milieu/structured therapy  >Treatment Interventions: Groups and Individual Therapy/CBT, Motivational counseling for substance abuse related issues.   >Dispo: Collateral and dispo planning pending further symptom and medication optimization      
Plan:   >Legal: 9.39  >Obs: Routine, no current SI. no need for CO, patient not expected to pose risk to self or others in controlled inpatient setting  >Psychiatric Meds: Restart outpatient medication regimen: Risperdal 1mg, titrate as tolerated. Observe for tolerability and efficacy. Patient had been poorly adherent prior to admission.    PRN medications:  Ativan 2mg oral Q6HR PRN for agitation and anxiety.  Haldol 5mg oral Q6HR PRN for agitation.   Benadryl 50mg oral Q6HR PRN for agitation.   Vistaril 50mg oral Q6HR PRN for anxiety.  Desyrel 50mg oral QHS PRN for insomnia.     >Labs: Admission labs reviewed, no acute findings. labs pending for tomorrow: A1c and lipid level. Hold antipsychotics if QTc >500  >Medical:   No acute concerns. No consultations needed at this time. No indication for CIWA. Patient with consistently stable VS, no visible physical symptoms of  withdrawal. During the course of treatment, will collaborate with medical team to manage medical issues.  >Diet: Regular  >Social: milieu/structured therapy  >Treatment Interventions: Groups and Individual Therapy/CBT, Motivational counseling for substance abuse related issues.   >Dispo: Collateral and dispo planning pending further symptom and medication optimization      

## 2021-07-20 NOTE — BH INPATIENT PSYCHIATRY PROGRESS NOTE - NSTXDCOPNOGOAL_PSY_ALL_CORE
Will agree to participate in appropriate outpatient care

## 2021-07-20 NOTE — BH INPATIENT PSYCHIATRY PROGRESS NOTE - NSTXMEDICPROGRES_PSY_ALL_CORE
No Change
Improving
Improving
Met - goal discontinued
Improving
No Change
Improving
Improving
No Change
No Change
Improving

## 2021-07-20 NOTE — BH INPATIENT PSYCHIATRY DISCHARGE NOTE - NSDCMRMEDTOKEN_GEN_ALL_CORE_FT
Russ Sustenna 156 mg/mL intramuscular suspension, extended release: 156 milligram(s) intramuscularly every 4 weeks; given 7/14/21; NEXT DUE: 8/11/21  lithium 450 mg oral tablet, extended release: 2.5 tab(s) orally once a day (at bedtime)   Pristiq 50 mg oral tablet, extended release: 1 tab(s) orally once a day (at bedtime)

## 2021-07-20 NOTE — BH INPATIENT PSYCHIATRY PROGRESS NOTE - NSTXPROBMEDIC_PSY_ALL_CORE
MEDICATION/TREATMENT NON-COMPLIANCE

## 2021-07-20 NOTE — BH INPATIENT PSYCHIATRY PROGRESS NOTE - NSBHMSEKNOWHOW_PSY_ALL_CORE
Current Events/Educational attainment/Vocabulary/Other...

## 2021-07-20 NOTE — BH INPATIENT PSYCHIATRY PROGRESS NOTE - NSBHFUPINTERVALCCFT_PSY_A_CORE
Psychosis and depression with SI+noncompliance

## 2021-07-20 NOTE — BH INPATIENT PSYCHIATRY PROGRESS NOTE - NSBHFUPINTERVALHXFT_PSY_A_CORE
RN report received, case discussed at team, Pt seen, MSE done.  ED note duly noted and appreciated.  Pt feels depressed, with SI but no plan, and paralyzed by his failures. Even feels distress that mother yelled at him hears ago "to shut up" and he continues to relive this. Feels he has bipolar but does not mention SCZ to writer. Appears to have limited inight into his illness and main concern is weight gain.  Agrees to FORD Sustenna restart but will discuss medication again in AM as unclear pt truly wishes to restart this medication. Will consider abilify+lithium instead.  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    WED 7/7 Pt shows slight improvements, feels AH attenuated, compliant with meds. Feels sleep poor and not restful, notably for months.  Psychoed on sleep hygiene today, sleep med options reviewed.  Reviewed SGA with FORD options- psychoed on this topic. Wants to continue invega for now.  Will also add lithium and start tonight, and later effexor xr for wt loss and concentration.    WED 7/14 Productive supportive therapy session again today. THURS we also completed lifetime hypo/jasmin checklist. Pt scored 13/32, not a high score or overly suggestive of BAD. FRI we built on this with explanations of KONG and BDA instruments, and pt to complete both and return to writer and did so today. KONG shows MILD anxiety and BDA shows MOD depression. Discussed both and how medication addresses these symptoms. HECTOR flood completed ZBPD instrument and Also discussed option of partial program. Agrees to continue FORD reloading with brighter affect emerging. Today pt wanted to write  down all instruments completed.  Also gave pt DES2 dissociative experience scale today, seemed very interested in the topic. We also discussed paranoia at work.  Improved rapport with md developing, much progress noted by pt, brighter affect emerging.  Pt appears less apathetic, with less blunting and negative symptoms with further improvements, does engage with discussion of instruments.    MON 7/19 More organized and linear, agreed to lithium increase last week to good effect. Second 156 mg FORD given last WED to good effect.  Engaging groups and milieu, and spends now  less time in bed and less isolative, further  improved.  Also feels very paralyzed by being very self conscious, does not know how to get past this and described how he got paranoid in different work situations.  We had productive family meeing with mother and case reviewed, meds reviewed, symptoms reviewed.  Stressed no further use of cannabinoids, which may be worsening symptoms.  REmains unclear if solely BAD spectrum vs SAD, but pt is vague and circumstantial at times, more consistent with SAD.  Agreed to meet with clinical psychologist FRI and wishes to continue sessions.  No new SEs reported or observed.  Agrees to effexor increase, feels less anxious.  Will changeover to pristiq.  MEDS ordered for TUES AM discharge.  TUES 7/20 Pt stable for discharge. Agrees to tx plan as o/p, meds reviewed. Completed DES2 which showed high degree of dissociation, score=24.  Denies all SIIP and psychosis, none eviden, nor jasmin or  depression.  No new SEs reported or observed.  In summary, much improved and pleased with his progress.
RN report received, case discussed at team, Pt seen, MSE done.  ED note duly noted and appreciated.  Pt feels depressed, with SI but no plan, and paralyzed by his failures. Even feels distress that mother yelled at him hears ago "to shut up" and he continues to relive this. Feels he has bipolar but does not mention SCZ to writer. Appears to have limited inight into his illness and main concern is weight gain.  Agrees to FORD Sustenna restart but will discuss medication again in AM as unclear pt truly wishes to restart this medication. Will consider abilify+lithium instead.  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    WED 7/7 Pt shows slight improvements, feels AH attenuated, compliant with meds. Feels sleep poor and not restful, notably for months.  Psychoed on sleep hygiene today, sleep med options reviewed.  Reviewed SGA with FORD options- psychoed on this topic. Wants to continue invega for now.  Will also add lithium and start tonight, and later effexor xr for wt loss and concentration.    TUES 7/13 Productive supportive therapy session again today. THURS we also completed lifetime hypo/jasmin checklist. Pt scored 13/32, not a high score or overly suggestive of BAD. FRI we built on this with explanations of KONG and BDA instruments, and pt to complete both and return to writer and did so today. KONG shows MILD anxiety and BDA shows MOD depression. Discussed both and how medication addresses these symptoms. Today [facility] completed ZBPD instrument and Also discussed option of partial program. Agrees to continue FORD reloading with brighter affect emerging.  Pt appears less apathetic, with less blunting and negative symptoms but slight improvements, does engage with discussion of instruments.  More organized and linear, agreed to lithium increase and slightly brighter affect emerging. Second FORD due this week.  Engaging groups and milieu, but spends much time in bed and isolative, slightly improved.  Also feels very paralyzed by being very self conscious, does not know how to get past this and described how he got paranoid in different work situations.  Agreed to meet with clinical psychologist FRI and wishes to continue sessions.  No new SEs reported or observed.
RN report received, case discussed at team, Pt seen, MSE done.  ED note duly noted and appreciated.  Pt feels depressed, with SI but no plan, and paralyzed by his failures. Even feels distress that mother yelled at him hears ago "to shut up" and he continues to relive this. Feels he has bipolar but does not mention SCZ to writer. Appears to have limited inight into his illness and main concern is weight gain.  Agrees to FORD Sustenna restart but will discuss medication again in AM as unclear pt truly wishes to restart this medication. Will consider abilify+lithium instead.  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      WED 7/7 Pt shows slight improvements, feels AH attenuated, compliant with meds. Feels sleep poor and not restful, notably for months.  Psychoed on sleep hygiene today, sleep med options reviewed.  Reviewed SGA with FORD options- psychoed on this topic. Wants to continue invega for now.  Will also add lithium and start tonight, and later effexor xr for wt loss and concentration.  Productive supportive therapy session for now.  Also feels very paralyzed by being very self conscious, does not know how to get past this.  No new SEs reported or observed.
RN report received, case discussed at team, Pt seen, MSE done.  ED note duly noted and appreciated.  Pt feels depressed, with SI but no plan, and paralyzed by his failures. Even feels distress that mother yelled at him hears ago "to shut up" and he continues to relive this. Feels he has bipolar but does not mention SCZ to writer. Appears to have limited inight into his illness and main concern is weight gain.  Agrees to FORD Sustenna restart but will discuss medication again in AM as unclear pt truly wishes to restart this medication. Will consider abilify+lithium instead.  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      WED 7/7 Pt shows slight improvements, feels AH attenuated, compliant with meds. Feels sleep poor and not restful, notably for months.  Psychoed on sleep hygiene today, sleep med options reviewed.  Reviewed SGA with FORD options- psychoed on this topic. Wants to continue invega for now.  Will also add lithium and start tonight, and later effexor xr for wt loss and concentration.    THURS 7/8 Productive supportive therapy session. Today we also completed lifetime hypo/jasmin checklist.  Pt scored 13/32, not a high score or overly suggestive of BAD. Pt appears mildly apathetic, with blunting and negative symptoms.  More organized and linear, agrees  to lithium increase.  Engaging groups and milieu, but spends much time in bed and isolative.  Also feels very paralyzed by being very self conscious, does not know how to get past this.  No new SEs reported or observed.
Pt seen for depression and psychosis. Chart reviewed and case discussed with treatment team. No events reported overnight. Pt observed in day area, calm and pleasant upon approach. Pt stated that he received FORD yesterday, denies any SE, states it is for his "psychosis" and states that the voices have improved. Pt relates that he has gained more insight into his need for medications and agrees that he needed FORD. Pt denies any current SIIP, HIIP, VH, or paranoia. Pt states he is looking forward to his family meeting on Monday and is looking forward to discharging to his parents' home next week. 
RN report received, case discussed at team, Pt seen, MSE done.  ED note duly noted and appreciated.  Pt feels depressed, with SI but no plan, and paralyzed by his failures. Even feels distress that mother yelled at him hears ago "to shut up" and he continues to relive this. Feels he has bipolar but does not mention SCZ to writer. Appears to have limited inight into his illness and main concern is weight gain.  Agrees to FORD Sustenna restart but will discuss medication again in AM as unclear pt truly wishes to restart this medication. Will consider abilify+lithium instead.  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      WED 7/7 Pt shows slight improvements, feels AH attenuated, compliant with meds. Feels sleep poor and not restful, notably for months.  Psychoed on sleep hygiene today, sleep med options reviewed.  Reviewed SGA with FORD options- psychoed on this topic. Wants to continue invega for now.  Will also add lithium and start tonight, and later effexor xr for wt loss and concentration.    FRI 7/9 Productive supportive therapy session again today. THURS we also completed lifetime hypo/jasmin checklist.  Pt scored 13/32, not a high score or overly suggestive of BAD. Today we built on this with explanations of KONG and BDA instruments, and pt to complete both and return to writer. Pt appears mildly apathetic, with blunting and negative symptoms but slight improvements, does engage with discussion of instruments.  More organized and linear, agreed to lithium increase.  Engaging groups and milieu, but spends much time in bed and isolative.  Also feels very paralyzed by being very self conscious, does not know how to get past this.  Agreed to meet with clinical psychologist today.  No new SEs reported or observed.
RN report received, case discussed at team, Pt seen, MSE done.  ED note duly noted and appreciated.  Pt feels depressed, with SI but no plan, and paralyzed by his failures. Even feels distress that mother yelled at him hears ago "to shut up" and he continues to relive this. Feels he has bipolar but does not mention SCZ to writer. Appears to have limited inight into his illness and main concern is weight gain.  Agrees to FORD Sustenna restart but will discuss medication again in AM as unclear pt truly wishes to restart this medication. Will consider abilify+lithium instead.  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  Also feels very paralyzed by being very self conscious, does not know how to get past this.  No new SEs reported or observed.
Pt seen for depression and psychosis. Chart reviewed and case discussed with treatment team. No events reported overnight; per nursing pt is more verbal and more visible on the unit. Pt observed on porch, calm and pleasant upon approach. Pt denies any SE from FORD or PO meds, denies any AH today, but appears thought blocked at times. Pt denies any current SIIP, HIIP, VH, or paranoia. Pt states he is looking forward to his family meeting on Monday and is looking forward to discharging to his parents' home next week. Pt asked appropriate questions regarding discharge. 
RN report received, case discussed at team, Pt seen, MSE done.  ED note duly noted and appreciated.  Pt feels depressed, with SI but no plan, and paralyzed by his failures. Even feels distress that mother yelled at him hears ago "to shut up" and he continues to relive this. Feels he has bipolar but does not mention SCZ to writer. Appears to have limited inight into his illness and main concern is weight gain.  Agrees to FORD Sustenna restart but will discuss medication again in AM as unclear pt truly wishes to restart this medication. Will consider abilify+lithium instead.  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    WED 7/7 Pt shows slight improvements, feels AH attenuated, compliant with meds. Feels sleep poor and not restful, notably for months.  Psychoed on sleep hygiene today, sleep med options reviewed.  Reviewed SGA with FORD options- psychoed on this topic. Wants to continue invega for now.  Will also add lithium and start tonight, and later effexor xr for wt loss and concentration.    WED 7/14 Productive supportive therapy session again today. THURS we also completed lifetime hypo/jasmin checklist. Pt scored 13/32, not a high score or overly suggestive of BAD. FRI we built on this with explanations of KONG and BDA instruments, and pt to complete both and return to writer and did so today. KONG shows MILD anxiety and BDA shows MOD depression. Discussed both and how medication addresses these symptoms. HECTOR flood completed ZBPD instrument and Also discussed option of partial program. Agrees to continue FORD reloading with brighter affect emerging. Today pt wanted to write  down all instruments completed.  Also gave pt DES2 dissociative experience scale today, seemed very interested in the topic. We also discussed paranoia at work.  Improved rapport with md developing, much progress noted by pt, brighter affect emerging.  Pt appears less apathetic, with less blunting and negative symptoms with further improvements, does engage with discussion of instruments.    MON 7/19 More organized and linear, agreed to lithium increase last week to good effect. Second 156 mg FORD given last WED to good effect.  Engaging groups and milieu, and spends now  less time in bed and less isolative, further  improved.  Also feels very paralyzed by being very self conscious, does not know how to get past this and described how he got paranoid in different work situations.  We had productive family meeing with mother and case reviewed, meds reviewed, symptoms reviewed.  Stressed no further use of cannabinoids, which may be worsening symptoms.  REmains unclear if solely BAD spectrum vs SAD, but pt is vague and circumstantial at times, more consistent with SAD.  Agreed to meet with clinical psychologist FRI and wishes to continue sessions.  No new SEs reported or observed.  Agrees to effexor increase, feels less anxious.  Will changeover to pristiq.  MEDS ordered for TUES AM discharge.  In summary, much improved and pleased with his progress.
RN report received, case discussed at team, Pt seen, MSE done.  ED note duly noted and appreciated.  Pt feels depressed, with SI but no plan, and paralyzed by his failures. Even feels distress that mother yelled at him hears ago "to shut up" and he continues to relive this. Feels he has bipolar but does not mention SCZ to writer. Appears to have limited inight into his illness and main concern is weight gain.  Agrees to FORD Sustenna restart but will discuss medication again in AM as unclear pt truly wishes to restart this medication. Will consider abilify+lithium instead.  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    WED 7/7 Pt shows slight improvements, feels AH attenuated, compliant with meds. Feels sleep poor and not restful, notably for months.  Psychoed on sleep hygiene today, sleep med options reviewed.  Reviewed SGA with FORD options- psychoed on this topic. Wants to continue invega for now.  Will also add lithium and start tonight, and later effexor xr for wt loss and concentration.    MON 7/9 Productive supportive therapy session again today. THURS we also completed lifetime hypo/jasmin checklist. Pt scored 13/32, not a high score or overly suggestive of BAD. FRI we built on this with explanations of KONG and BDA instruments, and pt to complete both and return to writer and did so today. KONG shows MILD anxiety and BDA shows MOD depression. Discussed both and how medication addresses these symptoms. Also discussed option of partial program.  Pt appears mildly apathetic, with blunting and negative symptoms but slight improvements, does engage with discussion of instruments.  More organized and linear, agreed to lithium increase and slightly brighter affect emerging. Second FORD due this week.  Engaging groups and milieu, but spends much time in bed and isolative.  Also feels very paralyzed by being very self conscious, does not know how to get past this and described how he got paranoid in different work situations.  Agreed to meet with clinical psychologist FRI and wishes to continue sessions.  No new SEs reported or observed.
RN report received, case discussed at team, Pt seen, MSE done.  ED note duly noted and appreciated.  Pt feels depressed, with SI but no plan, and paralyzed by his failures. Even feels distress that mother yelled at him hears ago "to shut up" and he continues to relive this. Feels he has bipolar but does not mention SCZ to writer. Appears to have limited inight into his illness and main concern is weight gain.  Agrees to FORD Sustenna restart but will discuss medication again in AM as unclear pt truly wishes to restart this medication. Will consider abilify+lithium instead.  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    WED 7/7 Pt shows slight improvements, feels AH attenuated, compliant with meds. Feels sleep poor and not restful, notably for months.  Psychoed on sleep hygiene today, sleep med options reviewed.  Reviewed SGA with FORD options- psychoed on this topic. Wants to continue invega for now.  Will also add lithium and start tonight, and later effexor xr for wt loss and concentration.    WED 7/14 Productive supportive therapy session again today. THURS we also completed lifetime hypo/jasmin checklist. Pt scored 13/32, not a high score or overly suggestive of BAD. FRI we built on this with explanations of KONG and BDA instruments, and pt to complete both and return to writer and did so today. KONG shows MILD anxiety and BDA shows MOD depression. Discussed both and how medication addresses these symptoms. HECTOR flood completed ZBPD instrument and Also discussed option of partial program. Agrees to continue FORD reloading with brighter affect emerging. Today pt wanted to write  down all instruments completed.  Also gave pt DES2 dissociative experience scale today, seemed very interested in the topic. We also discussed paranoia at work.  Improved rapport with md developing, much progress noted by pt, brighter affect emerging.  Pt appears less apathetic, with less blunting and negative symptoms with further improvements, does engage with discussion of instruments.  More organized and linear, agreed to lithium increase. Second FORD due by today else tomorrow AM- ordered.  Engaging groups and milieu, but spends now  less time in bed and isolative, further  improved.  Also feels very paralyzed by being very self conscious, does not know how to get past this and described how he got paranoid in different work situations.  Agreed to meet with clinical psychologist FRI and wishes to continue sessions.  No new SEs reported or observed.

## 2021-07-20 NOTE — BH INPATIENT PSYCHIATRY PROGRESS NOTE - NSTXSUBMISGOAL_PSY_ALL_CORE
Will develop a relapse prevention plan
Be able to acknowledge that substance abuse is a problem
Will develop a relapse prevention plan
Be able to acknowledge that substance abuse is a problem
Will verbalize 2 areas that serve as motivation for change as it pertains to addiction
Be able to acknowledge that substance abuse is a problem

## 2021-07-20 NOTE — BH INPATIENT PSYCHIATRY PROGRESS NOTE - NSTXPROBIMPULS_PSY_ALL_CORE
IMPULSIVITY/AGITATION

## 2021-07-20 NOTE — BH INPATIENT PSYCHIATRY PROGRESS NOTE - NSBHMSEAFFQUAL_PSY_A_CORE
Depressed/Irritable/Anxious
Depressed/Anxious/Other
Depressed/Irritable/Anxious
Depressed/Anxious/Other
Depressed/Irritable/Anxious
Anxious/Other
Depressed/Anxious/Other
Depressed/Anxious/Other
Depressed/Irritable/Anxious/Other
Anxious/Other
Anxious/Other

## 2021-07-20 NOTE — BH INPATIENT PSYCHIATRY PROGRESS NOTE - PRN MEDS
MEDICATIONS  (PRN):  diphenhydrAMINE 50 milliGRAM(s) Oral every 6 hours PRN Agitation/EPS  diphenhydrAMINE   Injectable 50 milliGRAM(s) IntraMuscular every 6 hours PRN severe agitation  gabapentin 300 milliGRAM(s) Oral four times a day PRN anxiety  haloperidol     Tablet 5 milliGRAM(s) Oral every 6 hours PRN agitation  haloperidol    Injectable 5 milliGRAM(s) IntraMuscular every 6 hours PRN severe agitation  LORazepam     Tablet 2 milliGRAM(s) Oral every 6 hours PRN Agitation  LORazepam     Tablet 1 milliGRAM(s) Oral at bedtime PRN anxiety/insomnia/schizoaffective disorder  LORazepam   Injectable 2 milliGRAM(s) IntraMuscular once PRN severe agitation  traZODone 50 milliGRAM(s) Oral at bedtime PRN insomnia  
MEDICATIONS  (PRN):  diphenhydrAMINE 50 milliGRAM(s) Oral every 6 hours PRN Agitation/EPS  diphenhydrAMINE   Injectable 50 milliGRAM(s) IntraMuscular every 6 hours PRN severe agitation  gabapentin 300 milliGRAM(s) Oral four times a day PRN anxiety  haloperidol     Tablet 5 milliGRAM(s) Oral every 6 hours PRN agitation  haloperidol    Injectable 5 milliGRAM(s) IntraMuscular every 6 hours PRN severe agitation  LORazepam     Tablet 2 milliGRAM(s) Oral every 6 hours PRN Agitation  LORazepam   Injectable 2 milliGRAM(s) IntraMuscular once PRN severe agitation  traZODone 50 milliGRAM(s) Oral at bedtime PRN insomnia  
MEDICATIONS  (PRN):  diphenhydrAMINE 50 milliGRAM(s) Oral every 6 hours PRN Agitation/EPS  diphenhydrAMINE   Injectable 50 milliGRAM(s) IntraMuscular every 6 hours PRN severe agitation  gabapentin 300 milliGRAM(s) Oral four times a day PRN anxiety  haloperidol     Tablet 5 milliGRAM(s) Oral every 6 hours PRN agitation  haloperidol    Injectable 5 milliGRAM(s) IntraMuscular every 6 hours PRN severe agitation  LORazepam     Tablet 2 milliGRAM(s) Oral every 6 hours PRN Agitation  LORazepam   Injectable 2 milliGRAM(s) IntraMuscular once PRN severe agitation  traZODone 50 milliGRAM(s) Oral at bedtime PRN insomnia  
MEDICATIONS  (PRN):  diphenhydrAMINE 50 milliGRAM(s) Oral every 6 hours PRN Agitation/EPS  diphenhydrAMINE   Injectable 50 milliGRAM(s) IntraMuscular every 6 hours PRN severe agitation  gabapentin 300 milliGRAM(s) Oral four times a day PRN anxiety  haloperidol     Tablet 5 milliGRAM(s) Oral every 6 hours PRN agitation  haloperidol    Injectable 5 milliGRAM(s) IntraMuscular every 6 hours PRN severe agitation  LORazepam     Tablet 2 milliGRAM(s) Oral every 6 hours PRN Agitation  LORazepam     Tablet 1 milliGRAM(s) Oral at bedtime PRN anxiety/insomnia/schizoaffective disorder  LORazepam   Injectable 2 milliGRAM(s) IntraMuscular once PRN severe agitation  traZODone 50 milliGRAM(s) Oral at bedtime PRN insomnia  
MEDICATIONS  (PRN):  diphenhydrAMINE 50 milliGRAM(s) Oral every 6 hours PRN Agitation/EPS  diphenhydrAMINE   Injectable 50 milliGRAM(s) IntraMuscular every 6 hours PRN severe agitation  gabapentin 300 milliGRAM(s) Oral four times a day PRN anxiety  haloperidol     Tablet 5 milliGRAM(s) Oral every 6 hours PRN agitation  haloperidol    Injectable 5 milliGRAM(s) IntraMuscular every 6 hours PRN severe agitation  LORazepam     Tablet 2 milliGRAM(s) Oral every 6 hours PRN Agitation  LORazepam     Tablet 1 milliGRAM(s) Oral at bedtime PRN anxiety/insomnia/schizoaffective disorder  LORazepam   Injectable 2 milliGRAM(s) IntraMuscular once PRN severe agitation  traZODone 50 milliGRAM(s) Oral at bedtime PRN insomnia  
MEDICATIONS  (PRN):  diphenhydrAMINE 50 milliGRAM(s) Oral every 6 hours PRN Agitation/EPS  diphenhydrAMINE   Injectable 50 milliGRAM(s) IntraMuscular every 6 hours PRN severe agitation  gabapentin 300 milliGRAM(s) Oral four times a day PRN anxiety  haloperidol     Tablet 5 milliGRAM(s) Oral every 6 hours PRN agitation  haloperidol    Injectable 5 milliGRAM(s) IntraMuscular every 6 hours PRN severe agitation  LORazepam     Tablet 2 milliGRAM(s) Oral every 6 hours PRN Agitation  LORazepam   Injectable 2 milliGRAM(s) IntraMuscular once PRN severe agitation  traZODone 50 milliGRAM(s) Oral at bedtime PRN insomnia  
MEDICATIONS  (PRN):  diphenhydrAMINE 50 milliGRAM(s) Oral every 6 hours PRN Agitation/EPS  diphenhydrAMINE   Injectable 50 milliGRAM(s) IntraMuscular every 6 hours PRN severe agitation  gabapentin 300 milliGRAM(s) Oral four times a day PRN anxiety  haloperidol     Tablet 5 milliGRAM(s) Oral every 6 hours PRN agitation  haloperidol    Injectable 5 milliGRAM(s) IntraMuscular every 6 hours PRN severe agitation  hydrOXYzine hydrochloride 50 milliGRAM(s) Oral every 6 hours PRN anxiety  LORazepam     Tablet 2 milliGRAM(s) Oral every 6 hours PRN Agitation  LORazepam     Tablet 1 milliGRAM(s) Oral at bedtime PRN anxiety/insomnia/schizoaffective disorder  LORazepam   Injectable 2 milliGRAM(s) IntraMuscular once PRN severe agitation  traZODone 50 milliGRAM(s) Oral at bedtime PRN insomnia  
MEDICATIONS  (PRN):  diphenhydrAMINE 50 milliGRAM(s) Oral every 6 hours PRN Agitation/EPS  diphenhydrAMINE   Injectable 50 milliGRAM(s) IntraMuscular every 6 hours PRN severe agitation  gabapentin 300 milliGRAM(s) Oral four times a day PRN anxiety  haloperidol     Tablet 5 milliGRAM(s) Oral every 6 hours PRN agitation  haloperidol    Injectable 5 milliGRAM(s) IntraMuscular every 6 hours PRN severe agitation  LORazepam     Tablet 2 milliGRAM(s) Oral every 6 hours PRN Agitation  LORazepam   Injectable 2 milliGRAM(s) IntraMuscular once PRN severe agitation  traZODone 50 milliGRAM(s) Oral at bedtime PRN insomnia  
MEDICATIONS  (PRN):  diphenhydrAMINE 50 milliGRAM(s) Oral every 6 hours PRN Agitation/EPS  diphenhydrAMINE   Injectable 50 milliGRAM(s) IntraMuscular every 6 hours PRN severe agitation  gabapentin 300 milliGRAM(s) Oral four times a day PRN anxiety  haloperidol     Tablet 5 milliGRAM(s) Oral every 6 hours PRN agitation  haloperidol    Injectable 5 milliGRAM(s) IntraMuscular every 6 hours PRN severe agitation  LORazepam     Tablet 2 milliGRAM(s) Oral every 6 hours PRN Agitation  LORazepam     Tablet 1 milliGRAM(s) Oral at bedtime PRN anxiety/insomnia/schizoaffective disorder  LORazepam   Injectable 2 milliGRAM(s) IntraMuscular once PRN severe agitation  traZODone 50 milliGRAM(s) Oral at bedtime PRN insomnia  
MEDICATIONS  (PRN):  diphenhydrAMINE 50 milliGRAM(s) Oral every 6 hours PRN Agitation/EPS  diphenhydrAMINE   Injectable 50 milliGRAM(s) IntraMuscular every 6 hours PRN severe agitation  haloperidol     Tablet 5 milliGRAM(s) Oral every 6 hours PRN agitation  haloperidol    Injectable 5 milliGRAM(s) IntraMuscular every 6 hours PRN severe agitation  hydrOXYzine hydrochloride 50 milliGRAM(s) Oral every 6 hours PRN anxiety  LORazepam     Tablet 2 milliGRAM(s) Oral every 6 hours PRN Agitation  LORazepam   Injectable 1 milliGRAM(s) IntraMuscular once PRN severe agitation  
MEDICATIONS  (PRN):  diphenhydrAMINE 50 milliGRAM(s) Oral every 6 hours PRN Agitation/EPS  diphenhydrAMINE   Injectable 50 milliGRAM(s) IntraMuscular every 6 hours PRN severe agitation  gabapentin 300 milliGRAM(s) Oral four times a day PRN anxiety  haloperidol     Tablet 5 milliGRAM(s) Oral every 6 hours PRN agitation  haloperidol    Injectable 5 milliGRAM(s) IntraMuscular every 6 hours PRN severe agitation  LORazepam     Tablet 2 milliGRAM(s) Oral every 6 hours PRN Agitation  LORazepam   Injectable 2 milliGRAM(s) IntraMuscular once PRN severe agitation  traZODone 50 milliGRAM(s) Oral at bedtime PRN insomnia

## 2021-07-20 NOTE — BH INPATIENT PSYCHIATRY PROGRESS NOTE - NSTXPROBPSYCHO_PSY_ALL_CORE
PSYCHOTIC SYMPTOMS

## 2021-07-20 NOTE — BH INPATIENT PSYCHIATRY PROGRESS NOTE - NSBHMETABOLIC_PSY_ALL_CORE_FT
BMI:   HbA1c: A1C with Estimated Average Glucose Result: 5.0 % (07-06-21 @ 09:28)    Glucose:   BP: 101/58 (07-07-21 @ 06:16) (101/58 - 126/70)  Lipid Panel: Date/Time: 07-06-21 @ 09:28  Cholesterol, Serum: 156  Direct LDL: --  HDL Cholesterol, Serum: 73  Total Cholesterol/HDL Ration Measurement: --  Triglycerides, Serum: 66  
BMI:   HbA1c: A1C with Estimated Average Glucose Result: 5.1 % (07-13-21 @ 18:35)    Glucose:   BP: 111/62 (07-16-21 @ 08:22) (107/70 - 111/62)  Lipid Panel: Date/Time: 07-06-21 @ 09:28  Cholesterol, Serum: 156  Direct LDL: --  HDL Cholesterol, Serum: 73  Total Cholesterol/HDL Ration Measurement: --  Triglycerides, Serum: 66  
BMI:   HbA1c: A1C with Estimated Average Glucose Result: 5.1 % (07-13-21 @ 18:35)    Glucose:   BP: 107/72 (07-15-21 @ 06:54) (107/70 - 107/72)  Lipid Panel: Date/Time: 07-06-21 @ 09:28  Cholesterol, Serum: 156  Direct LDL: --  HDL Cholesterol, Serum: 73  Total Cholesterol/HDL Ration Measurement: --  Triglycerides, Serum: 66  
BMI:   HbA1c: A1C with Estimated Average Glucose Result: 5.1 % (07-13-21 @ 18:35)    Glucose:   BP: 122/62 (07-19-21 @ 06:27) (102/69 - 122/62)  Lipid Panel: Date/Time: 07-06-21 @ 09:28  Cholesterol, Serum: 156  Direct LDL: --  HDL Cholesterol, Serum: 73  Total Cholesterol/HDL Ration Measurement: --  Triglycerides, Serum: 66  
BMI:   HbA1c: A1C with Estimated Average Glucose Result: 5.0 % (07-06-21 @ 09:28)    Glucose:   BP: 126/68 (07-11-21 @ 08:56) (106/63 - 126/78)  Lipid Panel: Date/Time: 07-06-21 @ 09:28  Cholesterol, Serum: 156  Direct LDL: --  HDL Cholesterol, Serum: 73  Total Cholesterol/HDL Ration Measurement: --  Triglycerides, Serum: 66  
BMI:   HbA1c: A1C with Estimated Average Glucose Result: 5.0 % (07-06-21 @ 09:28)    Glucose:   BP: 101/58 (07-07-21 @ 06:16) (101/58 - 101/58)  Lipid Panel: Date/Time: 07-06-21 @ 09:28  Cholesterol, Serum: 156  Direct LDL: --  HDL Cholesterol, Serum: 73  Total Cholesterol/HDL Ration Measurement: --  Triglycerides, Serum: 66  
BMI:   HbA1c: A1C with Estimated Average Glucose Result: 5.1 % (07-13-21 @ 18:35)    Glucose:   BP: 108/71 (07-12-21 @ 08:27) (108/71 - 126/68)  Lipid Panel: Date/Time: 07-06-21 @ 09:28  Cholesterol, Serum: 156  Direct LDL: --  HDL Cholesterol, Serum: 73  Total Cholesterol/HDL Ration Measurement: --  Triglycerides, Serum: 66  
BMI:   HbA1c: A1C with Estimated Average Glucose Result: 5.1 % (07-13-21 @ 18:35)    Glucose:   BP: 121/72 (07-20-21 @ 06:19) (102/69 - 122/62)  Lipid Panel: Date/Time: 07-06-21 @ 09:28  Cholesterol, Serum: 156  Direct LDL: --  HDL Cholesterol, Serum: 73  Total Cholesterol/HDL Ration Measurement: --  Triglycerides, Serum: 66  
BMI:   HbA1c: A1C with Estimated Average Glucose Result: 5.0 % (07-06-21 @ 09:28)    Glucose:   BP: 108/71 (07-12-21 @ 08:27) (106/63 - 126/68)  Lipid Panel: Date/Time: 07-06-21 @ 09:28  Cholesterol, Serum: 156  Direct LDL: --  HDL Cholesterol, Serum: 73  Total Cholesterol/HDL Ration Measurement: --  Triglycerides, Serum: 66  
BMI:   HbA1c: A1C with Estimated Average Glucose Result: 5.0 % (07-06-21 @ 09:28)    Glucose:   BP: 126/70 (07-05-21 @ 01:53) (126/70 - 145/81)  Lipid Panel: Date/Time: 07-06-21 @ 09:28  Cholesterol, Serum: 156  Direct LDL: --  HDL Cholesterol, Serum: 73  Total Cholesterol/HDL Ration Measurement: --  Triglycerides, Serum: 66  
BMI:   HbA1c: A1C with Estimated Average Glucose Result: 5.1 % (07-13-21 @ 18:35)    Glucose:   BP: 107/72 (07-15-21 @ 06:54) (107/70 - 107/72)  Lipid Panel: Date/Time: 07-06-21 @ 09:28  Cholesterol, Serum: 156  Direct LDL: --  HDL Cholesterol, Serum: 73  Total Cholesterol/HDL Ration Measurement: --  Triglycerides, Serum: 66

## 2021-07-20 NOTE — BH INPATIENT PSYCHIATRY DISCHARGE NOTE - NSBHMETABOLIC_PSY_ALL_CORE_FT
BMI:   HbA1c: A1C with Estimated Average Glucose Result: 5.1 % (07-13-21 @ 18:35)    Glucose:   BP: 121/72 (07-20-21 @ 06:19) (102/69 - 122/62)  Lipid Panel: Date/Time: 07-06-21 @ 09:28  Cholesterol, Serum: 156  Direct LDL: --  HDL Cholesterol, Serum: 73  Total Cholesterol/HDL Ration Measurement: --  Triglycerides, Serum: 66

## 2021-07-20 NOTE — BH INPATIENT PSYCHIATRY PROGRESS NOTE - NSBHMSEMOOD_PSY_A_CORE
Depressed/Anxious/Irritable/Angry
Anxious
Depressed/Anxious
Depressed/Anxious/Angry
Anxious
Depressed/Anxious/Angry
Normal
Depressed/Anxious/Irritable/Angry
Depressed/Anxious/Angry
Depressed/Anxious/Irritable/Angry
Depressed/Anxious/Irritable/Angry

## 2021-07-20 NOTE — BH INPATIENT PSYCHIATRY PROGRESS NOTE - NSTXPROBSUBMIS_PSY_ALL_CORE
SUBSTANCE MISUSE

## 2021-07-20 NOTE — BH INPATIENT PSYCHIATRY DISCHARGE NOTE - NSBHDCRISKMITIGATE_PSY_ALL_CORE
Safety planning/Reduction in access to lethal methods (pills, firearms, etc)/Long acting injectable medication/Medications targeting suicidality/non-suicidal self injurious behavior

## 2021-07-20 NOTE — BH INPATIENT PSYCHIATRY PROGRESS NOTE - NSTXMEDICDATETRGT_PSY_ALL_CORE
26-Jul-2021
26-Jul-2021
12-Jul-2021
19-Jul-2021
12-Jul-2021
19-Jul-2021

## 2021-07-20 NOTE — BH INPATIENT PSYCHIATRY PROGRESS NOTE - NSBHMSEAFFRANGE_PSY_A_CORE
Blunted/Constricted

## 2021-07-22 PROCEDURE — 90792 PSYCH DIAG EVAL W/MED SRVCS: CPT | Mod: 95

## 2021-07-26 PROCEDURE — 90853 GROUP PSYCHOTHERAPY: CPT | Mod: 95

## 2021-08-02 PROCEDURE — 90853 GROUP PSYCHOTHERAPY: CPT | Mod: 95

## 2021-08-09 PROCEDURE — 90853 GROUP PSYCHOTHERAPY: CPT | Mod: 95

## 2021-08-12 DIAGNOSIS — F25.9 SCHIZOAFFECTIVE DISORDER, UNSPECIFIED: ICD-10-CM

## 2021-08-16 PROCEDURE — 90853 GROUP PSYCHOTHERAPY: CPT | Mod: 95

## 2021-08-23 PROCEDURE — 90853 GROUP PSYCHOTHERAPY: CPT | Mod: 95

## 2021-08-25 PROCEDURE — 99214 OFFICE O/P EST MOD 30 MIN: CPT | Mod: 95

## 2021-08-30 PROCEDURE — 90853 GROUP PSYCHOTHERAPY: CPT | Mod: 95

## 2021-09-13 PROCEDURE — 90853 GROUP PSYCHOTHERAPY: CPT | Mod: 95

## 2021-09-15 PROCEDURE — 99214 OFFICE O/P EST MOD 30 MIN: CPT | Mod: 95

## 2021-09-27 PROCEDURE — 90853 GROUP PSYCHOTHERAPY: CPT | Mod: 95

## 2021-10-04 PROCEDURE — 90853 GROUP PSYCHOTHERAPY: CPT | Mod: 95

## 2021-10-06 PROCEDURE — 99214 OFFICE O/P EST MOD 30 MIN: CPT | Mod: 95

## 2021-10-11 PROCEDURE — 90853 GROUP PSYCHOTHERAPY: CPT | Mod: 95

## 2021-10-18 PROCEDURE — 90853 GROUP PSYCHOTHERAPY: CPT | Mod: 95

## 2021-10-25 PROCEDURE — 90853 GROUP PSYCHOTHERAPY: CPT | Mod: 95

## 2021-11-03 PROCEDURE — 99214 OFFICE O/P EST MOD 30 MIN: CPT | Mod: 95

## 2021-11-08 PROCEDURE — 90853 GROUP PSYCHOTHERAPY: CPT | Mod: 95

## 2021-11-15 PROCEDURE — 90853 GROUP PSYCHOTHERAPY: CPT | Mod: 95

## 2021-11-16 ENCOUNTER — APPOINTMENT (OUTPATIENT)
Dept: MRI IMAGING | Facility: HOSPITAL | Age: 26
End: 2021-11-16

## 2021-11-16 ENCOUNTER — OUTPATIENT (OUTPATIENT)
Dept: OUTPATIENT SERVICES | Facility: HOSPITAL | Age: 26
LOS: 1 days | End: 2021-11-16
Payer: SUBSIDIZED

## 2021-11-16 DIAGNOSIS — Z00.00 ENCOUNTER FOR GENERAL ADULT MEDICAL EXAMINATION WITHOUT ABNORMAL FINDINGS: ICD-10-CM

## 2021-11-16 DIAGNOSIS — Z00.6 ENCOUNTER FOR EXAMINATION FOR NORMAL COMPARISON AND CONTROL IN CLINICAL RESEARCH PROGRAM: ICD-10-CM

## 2021-11-16 PROCEDURE — 70551 MRI BRAIN STEM W/O DYE: CPT | Mod: 26

## 2021-11-16 PROCEDURE — 70551 MRI BRAIN STEM W/O DYE: CPT

## 2021-11-22 PROCEDURE — 90853 GROUP PSYCHOTHERAPY: CPT | Mod: 95

## 2021-11-29 PROCEDURE — 90853 GROUP PSYCHOTHERAPY: CPT | Mod: 95

## 2021-12-06 PROCEDURE — 90853 GROUP PSYCHOTHERAPY: CPT | Mod: 95

## 2021-12-08 PROCEDURE — 99214 OFFICE O/P EST MOD 30 MIN: CPT | Mod: 95

## 2021-12-13 PROCEDURE — 90853 GROUP PSYCHOTHERAPY: CPT | Mod: 95

## 2022-01-01 NOTE — BH INPATIENT PSYCHIATRY ASSESSMENT NOTE - COLLATERAL SOURCE
woke with left eye swelling resolved throughout the day, Eye still slightly red. Denies patient itching. iutd, no pmhx Personal collateral

## 2022-02-02 PROCEDURE — 99214 OFFICE O/P EST MOD 30 MIN: CPT | Mod: 95

## 2022-02-14 PROBLEM — Z00.00 ENCOUNTER FOR PREVENTIVE HEALTH EXAMINATION: Status: ACTIVE | Noted: 2022-02-14

## 2022-03-02 PROCEDURE — ZZZZZ: CPT

## 2022-04-13 PROCEDURE — 99214 OFFICE O/P EST MOD 30 MIN: CPT | Mod: 95

## 2022-05-11 PROCEDURE — 99214 OFFICE O/P EST MOD 30 MIN: CPT | Mod: 95

## 2022-05-26 NOTE — BH TREATMENT PLAN - NSTXCAREGIVERPARTICIPATE_PSY_P_CORE
Normal rate, regular rhythm.  Heart sounds S1, S2.  No murmurs, rubs or gallops. Family/Caregiver participated in identification of needs/problems/goals for treatment/Family/Caregiver participated in defining interventions/Family/Caregiver participated in development of after care plan

## 2022-06-15 PROCEDURE — 99214 OFFICE O/P EST MOD 30 MIN: CPT | Mod: 95

## 2022-07-13 PROCEDURE — 99214 OFFICE O/P EST MOD 30 MIN: CPT | Mod: 95

## 2022-07-18 PROCEDURE — 90853 GROUP PSYCHOTHERAPY: CPT | Mod: 95

## 2022-07-25 PROCEDURE — 90853 GROUP PSYCHOTHERAPY: CPT | Mod: 95

## 2022-08-10 PROCEDURE — 99214 OFFICE O/P EST MOD 30 MIN: CPT | Mod: 95

## 2022-09-07 PROCEDURE — 99214 OFFICE O/P EST MOD 30 MIN: CPT | Mod: 95

## 2022-10-26 PROCEDURE — 99214 OFFICE O/P EST MOD 30 MIN: CPT | Mod: 95

## 2022-12-06 ENCOUNTER — APPOINTMENT (OUTPATIENT)
Dept: MRI IMAGING | Facility: HOSPITAL | Age: 27
End: 2022-12-06

## 2022-12-08 ENCOUNTER — RESULT REVIEW (OUTPATIENT)
Age: 27
End: 2022-12-08

## 2022-12-08 ENCOUNTER — APPOINTMENT (OUTPATIENT)
Dept: MRI IMAGING | Facility: HOSPITAL | Age: 27
End: 2022-12-08

## 2022-12-08 ENCOUNTER — OUTPATIENT (OUTPATIENT)
Dept: OUTPATIENT SERVICES | Facility: HOSPITAL | Age: 27
LOS: 1 days | End: 2022-12-08
Payer: SUBSIDIZED

## 2022-12-08 DIAGNOSIS — Z00.6 ENCOUNTER FOR EXAMINATION FOR NORMAL COMPARISON AND CONTROL IN CLINICAL RESEARCH PROGRAM: ICD-10-CM

## 2022-12-08 DIAGNOSIS — Z00.00 ENCOUNTER FOR GENERAL ADULT MEDICAL EXAMINATION WITHOUT ABNORMAL FINDINGS: ICD-10-CM

## 2022-12-08 PROCEDURE — 70551 MRI BRAIN STEM W/O DYE: CPT | Mod: 26

## 2022-12-08 PROCEDURE — 70551 MRI BRAIN STEM W/O DYE: CPT

## 2022-12-12 PROCEDURE — 99214 OFFICE O/P EST MOD 30 MIN: CPT | Mod: 95

## 2023-03-27 PROCEDURE — 99213 OFFICE O/P EST LOW 20 MIN: CPT | Mod: 95

## 2023-03-27 PROCEDURE — 90833 PSYTX W PT W E/M 30 MIN: CPT | Mod: 95

## 2023-05-16 LAB
A1C WITH ESTIMATED AVERAGE GLUCOSE RESULT: 5.1 % — SIGNIFICANT CHANGE UP (ref 4–5.6)
ANION GAP SERPL CALC-SCNC: 10 MMOL/L — SIGNIFICANT CHANGE UP (ref 7–14)
BUN SERPL-MCNC: 13 MG/DL — SIGNIFICANT CHANGE UP (ref 7–23)
CALCIUM SERPL-MCNC: 9.4 MG/DL — SIGNIFICANT CHANGE UP (ref 8.4–10.5)
CHLORIDE SERPL-SCNC: 103 MMOL/L — SIGNIFICANT CHANGE UP (ref 98–107)
CHOLEST SERPL-MCNC: 195 MG/DL — SIGNIFICANT CHANGE UP
CO2 SERPL-SCNC: 23 MMOL/L — SIGNIFICANT CHANGE UP (ref 22–31)
CREAT SERPL-MCNC: 0.98 MG/DL — SIGNIFICANT CHANGE UP (ref 0.5–1.3)
EGFR: 108 ML/MIN/1.73M2 — SIGNIFICANT CHANGE UP
EGFR: 108 ML/MIN/1.73M2 — SIGNIFICANT CHANGE UP
ESTIMATED AVERAGE GLUCOSE: 100 — SIGNIFICANT CHANGE UP
GLUCOSE SERPL-MCNC: 87 MG/DL — SIGNIFICANT CHANGE UP (ref 70–99)
HCT VFR BLD CALC: 41.9 % — SIGNIFICANT CHANGE UP (ref 39–50)
HDLC SERPL-MCNC: 71 MG/DL — SIGNIFICANT CHANGE UP
HGB BLD-MCNC: 14.1 G/DL — SIGNIFICANT CHANGE UP (ref 13–17)
LDLC SERPL-MCNC: 110 MG/DL — HIGH
LIPID PNL WITH DIRECT LDL SERPL: 110 MG/DL — HIGH
MAGNESIUM SERPL-MCNC: 1.9 MG/DL — SIGNIFICANT CHANGE UP (ref 1.6–2.6)
MCHC RBC-ENTMCNC: 28.7 PG — SIGNIFICANT CHANGE UP (ref 27–34)
MCHC RBC-ENTMCNC: 33.7 GM/DL — SIGNIFICANT CHANGE UP (ref 32–36)
MCV RBC AUTO: 85.3 FL — SIGNIFICANT CHANGE UP (ref 80–100)
NONHDLC SERPL-MCNC: 124 MG/DL — SIGNIFICANT CHANGE UP
NRBC # BLD AUTO: 0 K/UL — SIGNIFICANT CHANGE UP (ref 0–0)
NRBC # BLD: 0 /100 WBCS — SIGNIFICANT CHANGE UP (ref 0–0)
NRBC # FLD: 0 K/UL — SIGNIFICANT CHANGE UP (ref 0–0)
NRBC BLD-RTO: 0 /100 WBCS — SIGNIFICANT CHANGE UP (ref 0–0)
PHOSPHATE SERPL-MCNC: 3.1 MG/DL — SIGNIFICANT CHANGE UP (ref 2.5–4.5)
PLATELET # BLD AUTO: 272 K/UL — SIGNIFICANT CHANGE UP (ref 150–400)
POTASSIUM SERPL-MCNC: 4.1 MMOL/L — SIGNIFICANT CHANGE UP (ref 3.5–5.3)
POTASSIUM SERPL-SCNC: 4.1 MMOL/L — SIGNIFICANT CHANGE UP (ref 3.5–5.3)
RBC # BLD: 4.91 M/UL — SIGNIFICANT CHANGE UP (ref 4.2–5.8)
RBC # FLD: 13.2 % — SIGNIFICANT CHANGE UP (ref 10.3–14.5)
SODIUM SERPL-SCNC: 136 MMOL/L — SIGNIFICANT CHANGE UP (ref 135–145)
TRIGL SERPL-MCNC: 68 MG/DL — SIGNIFICANT CHANGE UP
WBC # BLD: 4.24 K/UL — SIGNIFICANT CHANGE UP (ref 3.8–10.5)
WBC # FLD AUTO: 4.24 K/UL — SIGNIFICANT CHANGE UP (ref 3.8–10.5)

## 2023-06-12 PROCEDURE — 99214 OFFICE O/P EST MOD 30 MIN: CPT | Mod: 95

## 2023-06-12 PROCEDURE — 90853 GROUP PSYCHOTHERAPY: CPT | Mod: 95

## 2023-06-27 PROCEDURE — 99213 OFFICE O/P EST LOW 20 MIN: CPT | Mod: 95

## 2023-07-28 NOTE — BH TREATMENT PLAN - NSPTSTATEDGOAL_PSY_ALL_CORE
Made pt aware that he would get something in the mail about the enrique. Pt also said that [facility] had emailed him the letter also.  Yanely  
Pt brought himself to the hospital to get better
Pt brought himself to the hospital to get better

## 2023-08-07 PROCEDURE — ZZZZZ: CPT

## 2023-09-11 PROCEDURE — 99214 OFFICE O/P EST MOD 30 MIN: CPT | Mod: 95

## 2023-10-16 PROCEDURE — 99214 OFFICE O/P EST MOD 30 MIN: CPT | Mod: 95

## 2023-11-20 PROCEDURE — 99214 OFFICE O/P EST MOD 30 MIN: CPT | Mod: 95

## 2023-12-18 PROCEDURE — 99214 OFFICE O/P EST MOD 30 MIN: CPT | Mod: 95

## 2024-01-29 PROCEDURE — 99214 OFFICE O/P EST MOD 30 MIN: CPT | Mod: 95

## 2024-02-12 NOTE — BH INPATIENT PSYCHIATRY PROGRESS NOTE - NSTXMEDICGOAL_PSY_ALL_CORE
Take all medications as prescribed
Action 2: Continue
Be able to describe the benefit of medication/treatment
Continue Regimen: benzoyl peroxide 5 % topical gel - AM\\n\\ntretinoin 0.05% topical cream QHS - PM
Hide Aquaphor Products: No
Detail Level: Zone
Decrease Regimen: doxycycline monohydrate 50 mg capsule BID - two tablets twice a day to 2QAM 1QHS
Plan: Cetaphil gentle cleanser \\nCeraVe moisturizing cream\\nPatient has been missing doses, recommend adding alerts to remind patient to take medication
Take all medications as prescribed
Be able to describe the benefit of medication/treatment
Take all medications as prescribed
Take all medications as prescribed

## 2024-02-27 PROCEDURE — 99214 OFFICE O/P EST MOD 30 MIN: CPT

## 2024-05-03 NOTE — BH INPATIENT PSYCHIATRY PROGRESS NOTE - OTHER
Isabel Gusman.      mild PMR apathetic at times but slightly brighter affect emerging apathetic vs negative symptoms, slightly attenuated, engaged in discussion re instruments KONG and BDA, tolerated discussion well but unable to define anxiety improving  vague, instant parsing of proverbs

## 2024-05-20 PROCEDURE — 99214 OFFICE O/P EST MOD 30 MIN: CPT | Mod: 95

## 2024-06-10 PROCEDURE — 99214 OFFICE O/P EST MOD 30 MIN: CPT | Mod: 95

## 2024-08-19 PROCEDURE — 99214 OFFICE O/P EST MOD 30 MIN: CPT | Mod: 95

## 2024-11-11 PROCEDURE — 90833 PSYTX W PT W E/M 30 MIN: CPT | Mod: 93

## 2024-11-11 PROCEDURE — 99214 OFFICE O/P EST MOD 30 MIN: CPT | Mod: 95

## 2025-01-23 PROCEDURE — 90833 PSYTX W PT W E/M 30 MIN: CPT | Mod: 95

## 2025-01-23 PROCEDURE — 99214 OFFICE O/P EST MOD 30 MIN: CPT | Mod: 95

## 2025-01-24 NOTE — BH TREATMENT PLAN - NSTXSUICIDINTERRN_PSY_ALL_CORE
Subjective:       Patient ID: Yaneli Steele is a 68 y.o. female.    Chief Complaint: Cataract    HPI    Here for cataract evaluation per Dr Bhakta    Eye meds: None    68 year old female states over the past 2 years she has noticed a big   difference in vision. States she struggles to see at night due to glare   and sensitivity to street light. Denies flashes, floaters or diplopia.   Denies ocular pain. Pt only uses readers  Last edited by Liz Layton on 1/24/2025  9:14 AM.             Assessment:       1. Nuclear sclerosis of both eyes    2. Posterior subcapsular age-related cataract of right eye    3. Essential (primary) hypertension    4. Refractive error        Plan:       Visually significant cataract OU -Pt. Wants Sx.    HTN-No retinopathy OU.  RE      Cataract Surgery Consent: Patient with a visually significant cataract with difficulties of ADLs, reading, driving, night vision, glare (any and all).  Discussed with Patient/Family/Caregiver: options, risks and benefits, expectations of cataract surgery, utilized an eye model with questions and answers to facilitate discussion.  Discussed lens options and patient understands that glasses may be required for optimal vision for distance and/or near vision after cataract surgery.  The Patient/Family/Caregiver  voice good understanding and patient wishes to proceed with surgery.  The patient will likely benefit from surgery and patient signed consent for Right Eye.  CE OD 1st CNAOTO 20.5,        OS 2nd CNAOTO 20.5.  Control HTN.  
Encourage patient to make feelings known to staff; verbalize thoughts and feelings to staff
Assess for SI, HI, or thoughts of harming himself or others

## 2025-02-10 PROCEDURE — 99214 OFFICE O/P EST MOD 30 MIN: CPT | Mod: 95

## 2025-02-10 PROCEDURE — 90833 PSYTX W PT W E/M 30 MIN: CPT | Mod: 93

## 2025-03-03 PROCEDURE — 90833 PSYTX W PT W E/M 30 MIN: CPT | Mod: 95

## 2025-03-03 PROCEDURE — 99214 OFFICE O/P EST MOD 30 MIN: CPT | Mod: 95

## 2025-04-25 ENCOUNTER — INPATIENT (INPATIENT)
Facility: HOSPITAL | Age: 30
LOS: 6 days | Discharge: HOME | End: 2025-05-02
Attending: PSYCHIATRY & NEUROLOGY | Admitting: PSYCHIATRY & NEUROLOGY
Payer: COMMERCIAL

## 2025-04-25 VITALS
RESPIRATION RATE: 16 BRPM | TEMPERATURE: 99 F | DIASTOLIC BLOOD PRESSURE: 66 MMHG | HEART RATE: 86 BPM | SYSTOLIC BLOOD PRESSURE: 130 MMHG | OXYGEN SATURATION: 100 % | WEIGHT: 270.07 LBS | HEIGHT: 73 IN

## 2025-04-25 DIAGNOSIS — F20.9 SCHIZOPHRENIA, UNSPECIFIED: ICD-10-CM

## 2025-04-25 LAB
ADD ON TEST-SPECIMEN IN LAB: SIGNIFICANT CHANGE UP
ALBUMIN SERPL ELPH-MCNC: 4.5 G/DL — SIGNIFICANT CHANGE UP (ref 3.3–5)
ALP SERPL-CCNC: 79 U/L — SIGNIFICANT CHANGE UP (ref 40–120)
ALT FLD-CCNC: 14 U/L — SIGNIFICANT CHANGE UP (ref 4–41)
AMPHET UR-MCNC: NEGATIVE — SIGNIFICANT CHANGE UP
ANION GAP SERPL CALC-SCNC: 11 MMOL/L — SIGNIFICANT CHANGE UP (ref 7–14)
APAP SERPL-MCNC: <10 UG/ML — LOW (ref 15–25)
APPEARANCE UR: CLEAR — SIGNIFICANT CHANGE UP
AST SERPL-CCNC: 28 U/L — SIGNIFICANT CHANGE UP (ref 4–40)
BACTERIA # UR AUTO: NEGATIVE /HPF — SIGNIFICANT CHANGE UP
BARBITURATES UR SCN-MCNC: NEGATIVE — SIGNIFICANT CHANGE UP
BASOPHILS # BLD AUTO: 0.05 K/UL — SIGNIFICANT CHANGE UP (ref 0–0.2)
BASOPHILS NFR BLD AUTO: 0.8 % — SIGNIFICANT CHANGE UP (ref 0–2)
BENZODIAZ UR-MCNC: NEGATIVE — SIGNIFICANT CHANGE UP
BILIRUB SERPL-MCNC: 0.4 MG/DL — SIGNIFICANT CHANGE UP (ref 0.2–1.2)
BILIRUB UR-MCNC: NEGATIVE — SIGNIFICANT CHANGE UP
BUN SERPL-MCNC: 14 MG/DL — SIGNIFICANT CHANGE UP (ref 7–23)
CALCIUM SERPL-MCNC: 9.6 MG/DL — SIGNIFICANT CHANGE UP (ref 8.4–10.5)
CAST: 0 /LPF — SIGNIFICANT CHANGE UP (ref 0–4)
CHLORIDE SERPL-SCNC: 103 MMOL/L — SIGNIFICANT CHANGE UP (ref 98–107)
CO2 SERPL-SCNC: 24 MMOL/L — SIGNIFICANT CHANGE UP (ref 22–31)
COCAINE METAB.OTHER UR-MCNC: NEGATIVE — SIGNIFICANT CHANGE UP
COD CRY URNS QL: PRESENT
COLOR SPEC: YELLOW — SIGNIFICANT CHANGE UP
CREAT SERPL-MCNC: 1.17 MG/DL — SIGNIFICANT CHANGE UP (ref 0.5–1.3)
CREATININE URINE RESULT, DAU: 587 MG/DL — SIGNIFICANT CHANGE UP
DIFF PNL FLD: NEGATIVE — SIGNIFICANT CHANGE UP
EGFR: 86 ML/MIN/1.73M2 — SIGNIFICANT CHANGE UP
EGFR: 86 ML/MIN/1.73M2 — SIGNIFICANT CHANGE UP
EOSINOPHIL # BLD AUTO: 0.29 K/UL — SIGNIFICANT CHANGE UP (ref 0–0.5)
EOSINOPHIL NFR BLD AUTO: 4.6 % — SIGNIFICANT CHANGE UP (ref 0–6)
ETHANOL SERPL-MCNC: <10 MG/DL — SIGNIFICANT CHANGE UP
FENTANYL UR QL SCN: NEGATIVE — SIGNIFICANT CHANGE UP
GLUCOSE SERPL-MCNC: 98 MG/DL — SIGNIFICANT CHANGE UP (ref 70–99)
GLUCOSE UR QL: NEGATIVE MG/DL — SIGNIFICANT CHANGE UP
HCT VFR BLD CALC: 45.7 % — SIGNIFICANT CHANGE UP (ref 39–50)
HGB BLD-MCNC: 15.1 G/DL — SIGNIFICANT CHANGE UP (ref 13–17)
IANC: 3.89 K/UL — SIGNIFICANT CHANGE UP (ref 1.8–7.4)
IMM GRANULOCYTES NFR BLD AUTO: 0.3 % — SIGNIFICANT CHANGE UP (ref 0–0.9)
KETONES UR-MCNC: ABNORMAL MG/DL
LEUKOCYTE ESTERASE UR-ACNC: NEGATIVE — SIGNIFICANT CHANGE UP
LYMPHOCYTES # BLD AUTO: 1.55 K/UL — SIGNIFICANT CHANGE UP (ref 1–3.3)
LYMPHOCYTES # BLD AUTO: 24.7 % — SIGNIFICANT CHANGE UP (ref 13–44)
MCHC RBC-ENTMCNC: 29.7 PG — SIGNIFICANT CHANGE UP (ref 27–34)
MCHC RBC-ENTMCNC: 33 G/DL — SIGNIFICANT CHANGE UP (ref 32–36)
MCV RBC AUTO: 90 FL — SIGNIFICANT CHANGE UP (ref 80–100)
METHADONE UR-MCNC: NEGATIVE — SIGNIFICANT CHANGE UP
MONOCYTES # BLD AUTO: 0.48 K/UL — SIGNIFICANT CHANGE UP (ref 0–0.9)
MONOCYTES NFR BLD AUTO: 7.6 % — SIGNIFICANT CHANGE UP (ref 2–14)
NEUTROPHILS # BLD AUTO: 3.89 K/UL — SIGNIFICANT CHANGE UP (ref 1.8–7.4)
NEUTROPHILS NFR BLD AUTO: 62 % — SIGNIFICANT CHANGE UP (ref 43–77)
NITRITE UR-MCNC: NEGATIVE — SIGNIFICANT CHANGE UP
NRBC # BLD AUTO: 0 K/UL — SIGNIFICANT CHANGE UP (ref 0–0)
NRBC # FLD: 0 K/UL — SIGNIFICANT CHANGE UP (ref 0–0)
NRBC BLD AUTO-RTO: 0 /100 WBCS — SIGNIFICANT CHANGE UP (ref 0–0)
OPIATES UR-MCNC: NEGATIVE — SIGNIFICANT CHANGE UP
OXYCODONE UR-MCNC: NEGATIVE — SIGNIFICANT CHANGE UP
PCP SPEC-MCNC: SIGNIFICANT CHANGE UP
PCP UR-MCNC: NEGATIVE — SIGNIFICANT CHANGE UP
PH UR: 5.5 — SIGNIFICANT CHANGE UP (ref 5–8)
PLATELET # BLD AUTO: 273 K/UL — SIGNIFICANT CHANGE UP (ref 150–400)
POTASSIUM SERPL-MCNC: 4.3 MMOL/L — SIGNIFICANT CHANGE UP (ref 3.5–5.3)
POTASSIUM SERPL-SCNC: 4.3 MMOL/L — SIGNIFICANT CHANGE UP (ref 3.5–5.3)
PROT SERPL-MCNC: 8.3 G/DL — SIGNIFICANT CHANGE UP (ref 6–8.3)
PROT UR-MCNC: 30 MG/DL
RBC # BLD: 5.08 M/UL — SIGNIFICANT CHANGE UP (ref 4.2–5.8)
RBC # FLD: 12.5 % — SIGNIFICANT CHANGE UP (ref 10.3–14.5)
RBC CASTS # UR COMP ASSIST: 1 /HPF — SIGNIFICANT CHANGE UP (ref 0–4)
REVIEW: SIGNIFICANT CHANGE UP
SALICYLATES SERPL-MCNC: <0.3 MG/DL — LOW (ref 15–30)
SARS-COV-2 RNA SPEC QL NAA+PROBE: SIGNIFICANT CHANGE UP
SODIUM SERPL-SCNC: 138 MMOL/L — SIGNIFICANT CHANGE UP (ref 135–145)
SP GR SPEC: 1.03 — HIGH (ref 1–1.03)
SQUAMOUS # UR AUTO: 1 /HPF — SIGNIFICANT CHANGE UP (ref 0–5)
THC UR QL: NEGATIVE — SIGNIFICANT CHANGE UP
TOXICOLOGY SCREEN, DRUGS OF ABUSE, SERUM RESULT: SIGNIFICANT CHANGE UP
TSH SERPL-MCNC: 0.83 UIU/ML — SIGNIFICANT CHANGE UP (ref 0.27–4.2)
UROBILINOGEN FLD QL: 1 MG/DL — SIGNIFICANT CHANGE UP (ref 0.2–1)
WBC # BLD: 6.28 K/UL — SIGNIFICANT CHANGE UP (ref 3.8–10.5)
WBC # FLD AUTO: 6.28 K/UL — SIGNIFICANT CHANGE UP (ref 3.8–10.5)
WBC UR QL: 0 /HPF — SIGNIFICANT CHANGE UP (ref 0–5)

## 2025-04-25 PROCEDURE — 99285 EMERGENCY DEPT VISIT HI MDM: CPT | Mod: GC

## 2025-04-25 PROCEDURE — 99285 EMERGENCY DEPT VISIT HI MDM: CPT

## 2025-04-25 RX ORDER — HALOPERIDOL 10 MG/1
5 TABLET ORAL EVERY 6 HOURS
Refills: 0 | Status: DISCONTINUED | OUTPATIENT
Start: 2025-04-26 | End: 2025-05-02

## 2025-04-25 RX ORDER — MELATONIN 5 MG
3 TABLET ORAL AT BEDTIME
Refills: 0 | Status: DISCONTINUED | OUTPATIENT
Start: 2025-04-26 | End: 2025-05-02

## 2025-04-25 RX ORDER — HALOPERIDOL 10 MG/1
5 TABLET ORAL ONCE
Refills: 0 | Status: DISCONTINUED | OUTPATIENT
Start: 2025-04-26 | End: 2025-05-02

## 2025-04-25 RX ORDER — ACETAMINOPHEN 500 MG/5ML
650 LIQUID (ML) ORAL EVERY 6 HOURS
Refills: 0 | Status: DISCONTINUED | OUTPATIENT
Start: 2025-04-26 | End: 2025-05-02

## 2025-04-25 RX ORDER — LORAZEPAM 4 MG/ML
2 VIAL (ML) INJECTION ONCE
Refills: 0 | Status: DISCONTINUED | OUTPATIENT
Start: 2025-04-26 | End: 2025-04-29

## 2025-04-25 RX ORDER — PALIPERIDONE 9 MG/1
3 TABLET, EXTENDED RELEASE ORAL AT BEDTIME
Refills: 0 | Status: DISCONTINUED | OUTPATIENT
Start: 2025-04-26 | End: 2025-05-01

## 2025-04-25 RX ORDER — MAGNESIUM, ALUMINUM HYDROXIDE 200-200 MG
30 TABLET,CHEWABLE ORAL EVERY 4 HOURS
Refills: 0 | Status: DISCONTINUED | OUTPATIENT
Start: 2025-04-26 | End: 2025-05-02

## 2025-04-25 RX ORDER — BACITRACIN 500 UNIT/G
1 OINTMENT (GRAM) TOPICAL EVERY 12 HOURS
Refills: 0 | Status: COMPLETED | OUTPATIENT
Start: 2025-04-26 | End: 2025-04-29

## 2025-04-25 RX ORDER — LORAZEPAM 4 MG/ML
2 VIAL (ML) INJECTION EVERY 6 HOURS
Refills: 0 | Status: DISCONTINUED | OUTPATIENT
Start: 2025-04-26 | End: 2025-04-29

## 2025-04-25 RX ORDER — BACITRACIN 500 UNIT/G
1 OINTMENT (GRAM) TOPICAL ONCE
Refills: 0 | Status: COMPLETED | OUTPATIENT
Start: 2025-04-25 | End: 2025-04-25

## 2025-04-25 NOTE — ED BEHAVIORAL HEALTH NOTE - BEHAVIORAL HEALTH NOTE
At the provider's request, the patient's mother, Berkley ([phone]), was contacted for collateral information. The following information was obtained from the mother:    Patient: 29-year-old male residing with his mother and father. He is not currently working or studying. He has a history of schizophrenia and bipolar disorder. He is single with no children and was brought in by family.    Reason for Visit: The patient left the house with the door open, seemingly unaware of where he was going earlier in the day. He was found several blocks from home, walking aimlessly. Later, he admitted to not feeling well and to having lise down on the ground earlier. Patient admits to not feeling well and seeking help.    Symptoms/History: For most of the week, the patient has exhibited unusual thought patterns, moodiness, depression, and erratic, disjointed conversations, jumping rapidly between topics. He acknowledges that something is wrong but feels unable to control it. Although his sister overheard him say, "I can't do this anymore," he denies current suicidal or homicidal ideation, and there is no known history of suicidal attempts. His mother is unaware of any current auditory or visual hallucinations or a history of such symptoms. However, she suspects delusions based on his conversations, citing his reports of smelling strong disinfectants or gas around the house when no one else notices these odors. His mother reports poor sleep and has observed him awake and on his phone in the middle of the night. His appetite, typically good, has decreased over the past two days. His hygiene appears to be at his baseline. He seems less motivated recently. In November, the patient went missing for 12 hours, wearing only shorts and sneakers. 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    Baseline: Described as a homebody who enjoys walks, going to the gym, playing solitaire, reading, and video games.    Stressors: Upset with his current living situation.    Medical Problems: Reported complaints of constipation yesterday.    Medications: Receives an intramuscular injection every three months; the next is due on May 13th.    Treatment Team: Has a  at [facility]. He had an overnight visit to [facility] ED on Monday and was linked to Dr. Rogel at [facility]. He has one prior psychiatric admission at [facility] in 2021 and another at [facility], though details of the latter are unknown.    Family History: None reported.    Violence/Aggression: None reported. No access to firearms.    Substance Use: None reported.    Disposition: The patient's father is in the waiting room awaiting disposition. At the provider's request, the patient's mother, Berkley ([phone]), was contacted for collateral information. The following information was obtained from the mother:    Patient: 29-year-old male residing with his mother and father. He is not currently working or studying. He has a history of schizophrenia and bipolar disorder. He is single with no children and was brought in by family.    Reason for Visit: The patient left the house with the door open, seemingly unaware of where he was going earlier in the day. He was found several blocks from home, walking aimlessly. Later, he admitted to not feeling well and to having lise down on the ground earlier. Patient admits to not feeling well and seeking help.    Symptoms/History: For most of the week, the patient has exhibited unusual thought patterns, moodiness, depression, and erratic, disjointed conversations, jumping rapidly between topics. He acknowledges that something is wrong but feels unable to control it. Although his sister overheard him say, "I can't do this anymore," he denies current suicidal or homicidal ideation, and there is no known history of suicidal attempts. His mother is unaware of any current auditory or visual hallucinations or a history of such symptoms. However, she suspects delusions based on his conversations, citing his reports of smelling strong disinfectants or gas around the house when no one else notices these odors. His mother reports poor sleep and has observed him awake and on his phone in the middle of the night. His appetite, typically good, has decreased over the past two days. His hygiene appears to be at his baseline. He seems less motivated recently. In November, the patient went missing for 12 hours, wearing only shorts and sneakers. 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    Baseline: Described as a homebody who enjoys walks, going to the gym, playing solitaire, reading, and video games.    Stressors: Upset with his current living situation.    Medical Problems: Reported complaints of constipation yesterday.    Medications: Receives an intramuscular injection every three months; the next is due on May 13th.    Treatment Team: Has a  at [facility]. He had an overnight visit to [facility] ED on Monday and was linked to Dr. Rogel at [facility]. He has one prior psychiatric admission at [facility] in 2021 and another at [facility], though details of the latter are unknown.    Family History: None reported.    Violence/Aggression: None reported. No access to firearms.    Substance Use: None reported.    Disposition: The patient's father is in the waiting room awaiting disposition.    Patient to be admitted to [facility] pending medical clearance. Nursing to schedule transport. Mother informed of plan.

## 2025-04-25 NOTE — ED PROVIDER NOTE - CONSTITUTIONAL MANNER
Please call your pediatrician to schedule a hospital follow-up appointment in 2 days.    Follow previous instructions by cardiologist regarding vaccines.    When to call your healthcare provider  Call your healthcare provider right away if your child:   · Has worsening symptoms  · Has a deep, harsh-sounding cough  · Has a fever (see Fever and children, below)  Call 911  Call 911 right away if your child is:   · Breathing faster than normal or has wheezing or a whistling sound with breathing  · Difficult to arouse or wake up  · Unable to speak or swallow  · Having trouble breathing or has blue, purple, or gray skin or lips     appropriate for situation

## 2025-04-25 NOTE — ED BEHAVIORAL HEALTH ASSESSMENT NOTE - OTHER PAST PSYCHIATRIC HISTORY (INCLUDE DETAILS REGARDING ONSET, COURSE OF ILLNESS, INPATIENT/OUTPATIENT TREATMENT)
Per pt, patient has history of primary psychotic disorder, schizophrenia vs schizoaffective disorder. 1st inpatient hospitalization in 2017. 2 prior inpatient hospitalizations. Last in 5/12-25/2021 at [facility], Per pt, patient has history of primary psychotic disorder, schizophrenia vs schizoaffective disorder. 1st inpatient hospitalization in 2017. 2 prior inpatient hospitalizations. Last in 5/12-25/2021 at [facility], the [place] [id number]

## 2025-04-25 NOTE — ED BEHAVIORAL HEALTH ASSESSMENT NOTE - SUMMARY
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 Hidalgo at [facility] clinic; adherent with Invega Trinza 546 mg q3mo FORD (last 2/11/25); no known suicidality or violence; no legal issues; no active substance use (CBD gummies 1 yr ago); and no significant PMH who was brought in by his mother due to 1 week of delusions of being poisoned, thought disorganization, psychotic guilt, and mood lability in the context of being due for next FORD dose on 5/6/25.    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 Hidalgo at [facility] clinic; adherent with Invega Trinza 546 mg q3mo FORD (last 2/11/25); no known suicidality or violence; no legal issues; no active substance use (CBD gummies 1 yr ago); and no significant PMH who was brought in by his mother due to 1 week of delusions of being poisoned, thought disorganization, psychotic guilt, and mood lability in the context of being due for next FORD dose on 5/6/25.    On interview, pt presents with depressed mood, excessive guilt about "not being man enough," somatic delusions (of poison causing gangrene and seizure d/o), paranoia, bizarre behavior (left house abruptly), and thought disorder. Presentation is consistent with decompensation of psychosis with possible depressed mood component (schizophrenia vs schizoaffective). Patient does have some insight that he needs to have his medications adjusted and requests voluntary admission for safety/stabilization.    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 5/12-5/25/2021; [facility] 2017); follows with Dr. Hidalgo at [facility] clinic; adherent with Invega Trinza 546 mg q3mo FORD (last 2/11/25); no known suicidality or violence; no legal issues; no active substance use (CBD gummies 1 yr ago); and no significant PMH who was brought in by his mother due to 1 week of delusions of being poisoned, thought disorganization, psychotic guilt, and mood lability in the context of being due for next FORD dose on 5/6/25.    On interview, pt presents with depressed mood, excessive guilt about "not being man enough," somatic delusions (of poison causing gangrene and seizure d/o), paranoia, bizarre behavior (left house abruptly), and thought disorder. Presentation is consistent with decompensation of psychosis with possible depressed mood component (schizophrenia vs schizoaffective). Patient does have some insight that he needs to have his medications adjusted and requests voluntary admission for safety/stabilization.    PLAN  - Admit 9.13 to [facility]  - No indication for 1:1 CO as pt denies SI or HI  - PRNs: Haldol 5 mg + Ativan 2 mg PO/IM q6h as needed for agitation  - Start paliperidone 3 mg HS for psychosis  - Pt due for Invega Trinza 546 mg on 5/6/25 - consider switching to Invega Sustenna if pt is decompensating at end of 3 month FORD period  - Medical: Denies PMH or home meds. Sole of R foot with abrasion, no signs of infection - can apply bacitracin per medicine. CK with mild elevation, recommend oral hydration and consider repeating with next labs  - Mother updated on plan for admission

## 2025-04-25 NOTE — ED BEHAVIORAL HEALTH ASSESSMENT NOTE - NSSUICRSKFACTOR_PSY_ALL_CORE
Current and Past Psychiatric Diagnoses/Treatment Related Factors 27-Dec-2023 10:44 Current and Past Psychiatric Diagnoses/Activating Events/Stressors

## 2025-04-25 NOTE — ED BEHAVIORAL HEALTH ASSESSMENT NOTE - HPI (INCLUDE ILLNESS QUALITY, SEVERITY, DURATION, TIMING, CONTEXT, MODIFYING FACTORS, ASSOCIATED SIGNS AND SYMPTOMS)
The patient left the house with the door open, seemingly unaware of where he was going earlier in the day. He was found several blocks from home, walking aimlessly. Later, he admitted to not feeling well and to having lise down on the ground earlier. Patient admits to not feeling well and seeking help.            Patient was seen and evaluated, chart reviewed. Case discussed with nursing team.  On service for this 25 year old  male with PPH of Schizoaffective Disorder Bipolar type.  Patient is hospitalized with a primary problem of worsening mood, with suicidal ideations. Patient admitted to [facility] on a 9.39 legal status. I have reviewed the initial psychiatric assessment in the electronic medical record, including the history of present illness, past psychiatric history, family/social history (no pertinent changes), and exam, and have confirmed the salient findings dated 21.  As per chart review, transferring records indicated the followin y/o male, domiciled w/ parents and sister in [facility], previously working a temp job in , single, no dependants, reported history of schizophrenia vs schizoaffective disorder bipolar type, no pertinent PMH, tested COVID-19 + in May 2021 (completed Moderna series last month) NKDA, denies any SA or self-injurious behavior, family denies history of violence or aggression, 2 prior inpatient psych admissions, 1st in , last in - at [facility], in outpatient psychiatric tx at [facility], denies substance use (but reports eating CBD gummies)  reportedly last had Invega FORD on 2021, who was brought to the ED via EMS activated by patient who called 911 due to suicidal ideation in setting of missing dose of FORD.   On interview, pt was calm and cooperative though overtly thought disordered. Pt reports that he has been having difficulty sleeping, his mind is "racing," he was Tweeting profusely, he has been feeling depressed because he has not found his "Gladys" (he has been single for 5 years). He states that he is "paleosexual" which he defines as an intellectual sexual person. He states that he wants to find a "wife" that cooks and wishes that he could be sexually active (but reports that it is unlikely to find someone who "understands" him). He states that he called 911 because he was thinking about "ending it all" and was thinking about ending his life. Reports thoughts of wishing he was dead currently, but denies intent or plan (he identified suicidal ideation as "feeling light blue." He denies any prior history of SA. He believes it is possible that he has bipolar disorder and that he previously was on Risperdal but has been on Invega for the last 2 years, which he does not believe he needs. He reports history of AVH which he perceives as "loud music" and "notes." He states that this is keeping him up at night.  He reports using CBD products regularly, which he states that makes the music "louder" stated, "weed is loud." He denies using substances (other than 1 beer earlier today) but states that he feels "[place]" from 2nd hand smoke is causing his voices. Of note, patient states that he at times stops taking his Invega because he is nervous about weight gain. (He previously weight 370 pounds and lost over 100 pounds. HE stated that he was able to lose weight on both Invega and off of Invega, but that he stops this medication due to concern for weight gain. He states that he would be open to trying new medication or changing the formulation of Invega to Trinza, though he feels that his Invega at times doesn't "hold" him, though he missed his dose 2 weeks ago.   Collateral information obtained from pt's mother Ronna Johnson ([phone]). Report that pt has a history of bipolar disorder and schizophrenia and receives his outpt psychiatric treatment at [facility] but recently changed psychiatrists. He had gone to [place] today and family found it strange he told them he was going to go to [place] later to watch the  [employer]. They were unaware pt was in the ED, but noted that pt calls the ED when he is "distressed." Pt has a history of medication nonadherence, he had been off of his FORD since 2020 until May 2021. Pt had been doing "okay" since his last inpatient psychiatric hospitalization, had lost weight and had been exercising. Report pt does not have a history of SI or SA, no history of aggression or violence. Updated family of treatment plan.    On unit:  Information Received From: Chart review and patient interview    Patient is followed up for depression and psychosis.  Chart, medications and labs reviewed.  Patient is discussed with nursing staff. No significant overnight issues.      Patient is observed in his room but agrees to interview.  When questioned about his mood patient reports feeling “ I spent most of the day diving deeply into my mood.”  Patient admits to low mood. Denies SI, anxiety or jasmin.  Reports prior to admission he was feeling very depressed and suicidal , SI started 2 days ago reports “I started feeling more Brian.” Reports poor sleep for 2 days,  racing thoughts, more psychotic symptoms. Endorses +AH, IOR “the music is talking to me an then I do what it says” unable to elaborate what sort of things. Regarding AH pt reports hearing the same things over and over, he describes it as “interlooping thoughts, thoughts on top of thoughts on top of thoughts.”    Patient presents intermally preoccupied, disorganized with ongoing perceptual disturbances. Denies drug use states he is allergic to cannabis, reports occasional alcohol, last drank two days ago, reports 30 ounces of liquor. Denies any w/d symptoms, no hx of sx w/d, u-tox negative. Reports medication trial with Invega FORD, “it didn’t work for me” and Risperdal.   No mention of sleep disturbances or appetite concerns om the unit.  Remains compliant with standing medications.  The need for medication compliance is emphasized.  Self- care remains fair.  No acute medical concerns, denies any pain. VSS. Continue to provide therapeutic support. 28 yo M; living with parents and sister in 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 Hidalgo at [facility]; adherent with Invega Trinza 546 mg q3mo FORD (last 2/11/25); no known suicidality or violence; no legal issues; no active substance use (CBD gummies 1 yr ago); and no significant PMH who was brought in by his mother due to 1 week of delusions of being poisoned, thought disorganization, psychotic guilt, and mood lability in the context of being due for next FORD dose on 5/6/25.             On interview, pt was calm and cooperative though overtly thought disordered. Pt reports that he has been having difficulty sleeping, his mind is "racing," he was Tweeting profusely, he has been feeling depressed because he has not found his "Gladys" (he has been single for 5 years). He states that he is "paleosexual" which he defines as an intellectual sexual person. He states that he wants to find a "wife" that cooks and wishes that he could be sexually active (but reports that it is unlikely to find someone who "understands" him). He states that he called 911 because he was thinking about "ending it all" and was thinking about ending his life. Reports thoughts of wishing he was dead currently, but denies intent or plan (he identified suicidal ideation as "feeling light blue." He denies any prior history of SA. He believes it is possible that he has bipolar disorder and that he previously was on Risperdal but has been on Invega for the last 2 years, which he does not believe he needs. He reports history of AVH which he perceives as "loud music" and "notes." He states that this is keeping him up at night.  He reports using CBD products regularly, which he states that makes the music "louder" stated, "weed is loud." He denies using substances (other than 1 beer earlier today) but states that he feels "[place]" from 2nd hand smoke is causing his voices. Of note, patient states that he at times stops taking his Invega because he is nervous about weight gain. (He previously weight 370 pounds and lost over 100 pounds. HE stated that he was able to lose weight on both Invega and off of Invega, but that he stops this medication due to concern for weight gain. He states that he would be open to trying new medication or changing the formulation of Invega to Trinza, though he feels that his Invega at times doesn't "hold" him, though he missed his dose 2 weeks ago.   Collateral information obtained from pt's mother Ronna Johnson ([phone]). Report that pt has a history of bipolar disorder and schizophrenia and receives his outpt psychiatric treatment at [facility] but recently changed psychiatrists. He had gone to [place] today and family found it strange he told them he was going to go to [place] later to watch the 4th of July [employer]. They were unaware pt was in the ED, but noted that pt calls the ED when he is "distressed." Pt has a history of medication nonadherence, he had been off of his FORD since Sept 2020 until May 2021. Pt had been doing "okay" since his last inpatient psychiatric hospitalization, had lost weight and had been exercising. Report pt does not have a history of SI or SA, no history of aggression or violence. Updated family of treatment plan.    On unit:  Information Received From: Chart review and patient interview    Patient is followed up for depression and psychosis.  Chart, medications and labs reviewed.  Patient is discussed with nursing staff. No significant overnight issues.      Patient is observed in his room but agrees to interview.  When questioned about his mood patient reports feeling “ I spent most of the day diving deeply into my mood.”  Patient admits to low mood. Denies SI, anxiety or jasmin.  Reports prior to admission he was feeling very depressed and suicidal , SI started 2 days ago reports “I started feeling more Brian.” Reports poor sleep for 2 days,  racing thoughts, more psychotic symptoms. Endorses +AH, IOR “the music is talking to me an then I do what it says” unable to elaborate what sort of things. Regarding AH pt reports hearing the same things over and over, he describes it as “interlooping thoughts, thoughts on top of thoughts on top of thoughts.”    Patient presents intermally preoccupied, disorganized with ongoing perceptual disturbances. Denies drug use states he is allergic to cannabis, reports occasional alcohol, last drank two days ago, reports 30 ounces of liquor. Denies any w/d symptoms, no hx of sx w/d, u-tox negative. Reports medication trial with Invega FORD, “it didn’t work for me” and Risperdal.   No mention of sleep disturbances or appetite concerns om the unit.  Remains compliant with standing medications.  The need for medication compliance is emphasized.  Self- care remains fair.  No acute medical concerns, denies any pain. VSS. Continue to provide therapeutic support. 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 Hidalgo at [facility] clinic; adherent with Invega Trinza 546 mg q3mo FORD (last 2/11/25); no known suicidality or violence; no legal issues; no active substance use (CBD gummies 1 yr ago); and no significant PMH who was brought in by his mother due to 1 week of delusions of being poisoned, thought disorganization, psychotic guilt, and mood lability in the context of being due for next FORD dose on 5/6/25.     In the ED, patient is calm but interview is limited by thought disorder. 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         When asked what brought him in, pt states "I don't know why I'd be here as a man in a woman's [facility], but it's okay because I need help." Patient states that he saw a woman giving birth and is noticing multiple female staff members who work in the ED.       On interview, pt was calm and cooperative though overtly thought disordered. Pt reports that he has been having difficulty sleeping, his mind is "racing," he was Tweeting profusely, he has been feeling depressed because he has not found his "Gladys" (he has been single for 5 years). He states that he is "paleosexual" which he defines as an intellectual sexual person. He states that he wants to find a "wife" that cooks and wishes that he could be sexually active (but reports that it is unlikely to find someone who "understands" him). He states that he called 911 because he was thinking about "ending it all" and was thinking about ending his life. Reports thoughts of wishing he was dead currently, but denies intent or plan (he identified suicidal ideation as "feeling light blue." He denies any prior history of SA. He believes it is possible that he has bipolar disorder and that he previously was on Risperdal but has been on Invega for the last 2 years, which he does not believe he needs. He reports history of AVH which he perceives as "loud music" and "notes." He states that this is keeping him up at night.  He reports using CBD products regularly, which he states that makes the music "louder" stated, "weed is loud." He denies using substances (other than 1 beer earlier today) but states that he feels "[place]" from 2nd hand smoke is causing his voices. Of note, patient states that he at times stops taking his Invega because he is nervous about weight gain. (He previously weight 370 pounds and lost over 100 pounds. HE stated that he was able to lose weight on both Invega and off of Invega, but that he stops this medication due to concern for weight gain. He states that he would be open to trying new medication or changing the formulation of Invega to Trinza, though he feels that his Invega at times doesn't "hold" him, though he missed his dose 2 weeks ago.     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 Hidalgo at [facility] clinic; adherent with Invega Trinza 546 mg q3mo FORD (last 2/11/25); no known suicidality or violence; no legal issues; no active substance use (CBD gummies 1 yr ago); and no significant PMH who was brought in by his mother due to 1 week of delusions of being poisoned, thought disorganization, psychotic guilt, and mood lability in the context of being due for next FORD dose on 5/6/25.     In the ED, patient is calm but interview is limited by thought disorder. 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 He reports feeling "childish" because he plays video games, asks his mom to help with his meals, and is not working. He repeatedly makes statements like "I'm such as idiot" and "I should have known that."  He also believes that he has been getting sick in multiple ways, primarily by being poisoned by something in his home and developing gangrene in his mouth and lips. He endorses delusions that a gas leak, poison in his water, or household cleaning sprays could be the cause of his gangrene. Patient also believes he has a seizure disorder because he feels weak at times and his mother has seizures. He believes that his saliva could poison others, so he offers to wear a mask during the interview. Pt states he has not been eating or drinking much as he is not sure where the poison is coming from.     He also endorses paranoia that his mother is talking about him to her friend Katerine, who he suspects works at [employer] (pt met RN named Katerine in  area). He denies any fear that others will harm him. He does report passing thought in the ambulance today that he should rape someone in order to be punished severely, as he believes he is a bad person. He denies having any specific person in mind to rape/assault and reports no intent/plan to act on this thought. He endorses AH of music notes but denies any CAH or voices.  Pt denies VH, but does make bizarre statements such as that he saw a woman giving birth in the ED before being brought to .  He states, "I don't know why I'd be here as a man in a woman's hospital, but it's okay because I need help." Reports poor sleep due to "making music" and having conversations with friends. Pt states he has been going on walks daily up to 50 miles/day to get space from his family. He endorses hopelessness and passive SI, but denies active SIIP, SIB, or suicide attempts.     On ROS, pt denies elevated/irritable mood, increased energy/activity, grandiosity, substance use, h/o complicated withdrawal.    See  chart note for collateral

## 2025-04-25 NOTE — ED ADULT TRIAGE NOTE - CHIEF COMPLAINT QUOTE
pt sent in by mother c/o mood swings and bizarre behavior x1 week. disorganized in conversation. hx. schizophrenia.(compliant with meds). pt denies SI. HI, hallucinations, drug or alcohol use. Mother Berkley [phone]

## 2025-04-25 NOTE — ED ADULT TRIAGE NOTE - NS_BHTRGSOURCE_ED_A_ED_FT
[Chaperone Present] : A chaperone was present in the examining room during all aspects of the physical examination [[id number]] : A chaperone was present during the pelvic exam. [Appropriately responsive] : appropriately responsive [Alert] : alert [No Acute Distress] : no acute distress [Soft] : soft [Non-tender] : non-tender [Non-distended] : non-distended [No HSM] : No HSM [No Lesions] : no lesions [No Mass] : no mass [Oriented x3] : oriented x3 [Examination Of The Breasts] : a normal appearance [No Masses] : no breast masses were palpable [Labia Majora] : normal [Labia Minora] : normal [Normal] : normal [Uterine Adnexae] : normal [No Tenderness] : no tenderness [Nl Sphincter Tone] : normal sphincter tone Berkley

## 2025-04-25 NOTE — ED ADULT NURSE NOTE - OBJECTIVE STATEMENT
Hx: Schizophrenia? Bipolar? Pt from home as per Pt feeling depressed, suffering from insomnia and having racing thoughts. Pt lives at home with mother who states Pt displaying bizarre  behavior. Pt expresses paranoia that RN Katerine knows his mom and is going to tell on him. Denies: SI, HI, hallucinations, eTOH/drug use.

## 2025-04-25 NOTE — ED BEHAVIORAL HEALTH ASSESSMENT NOTE - PATIENT'S CHIEF COMPLAINT
"I called 911 because I was having thoughts to end it all" "I don't know why I'd be here as a man in a woman's hospital."

## 2025-04-25 NOTE — ED BEHAVIORAL HEALTH ASSESSMENT NOTE - CURRENT MEDICATION
MEDICATIONS  (STANDING):  LORazepam     Tablet 1 milliGRAM(s) Oral at bedtime  risperiDONE   Tablet 1 milliGRAM(s) Oral at bedtime    MEDICATIONS  (PRN):  diphenhydrAMINE 50 milliGRAM(s) Oral every 6 hours PRN Agitation/EPS  diphenhydrAMINE   Injectable 50 milliGRAM(s) IntraMuscular every 6 hours PRN severe agitation  haloperidol     Tablet 5 milliGRAM(s) Oral every 6 hours PRN agitation  haloperidol    Injectable 5 milliGRAM(s) IntraMuscular every 6 hours PRN severe agitation  LORazepam     Tablet 2 milliGRAM(s) Oral every 6 hours PRN Agitation  LORazepam   Injectable 1 milliGRAM(s) IntraMuscular once PRN severe agitation

## 2025-04-25 NOTE — ED BEHAVIORAL HEALTH ASSESSMENT NOTE - RISK ASSESSMENT
The patient is at chronically elevated risk of self harm and suicide. Risk factors include recent passive SI, psychosis, history of treatment nonadherence, hx of inpatient hospitalizations. Protective factors include no active SI/I/P on unit pt help seeking,, stable housing, supportive housing. Overall, the patient is an acute and imminent risk of harm and meets criteria for psychiatric hospitalization for safety and stabilization. Risk will be mitigated by admission to psychiatric hospital. The patient is at chronically elevated risk of self harm and suicide. Risk factors include recent passive SI, psychosis, history of treatment nonadherence, hx of inpatient hospitalizations. Protective factors include no active SI/I/P on unit pt help seeking, stable housing, supportive housing. Overall, the patient is at elevated acute risk of harm to self and meets criteria for psychiatric hospitalization for safety and stabilization. Risk will be mitigated by admission to psychiatric hospital.

## 2025-04-25 NOTE — ED BEHAVIORAL HEALTH NOTE - BEHAVIORAL HEALTH NOTE
as per zunilda "Notifications  Limited DataThis individual has less than 1 year of Medicaid eligibility and therefore has limited data available in their Clinical Summary  Mental Health Placement Consideration due to1 or more ER visits or inpatient stays in the past year with a suicide attempt/ suicide ideation/ self-harm code; 1 or more inpatient MH stays in past 5 years  Alerts • all availableMost Recent   2Treatment for Suicidal Ideation (2 Inpatient)  [facility]  Active Quality Flags • as of monthly QI report 3/1/2025  Mental Health Placement Consideration  1 or more ER visits or inpatient stays in the past year with a suicide attempt/ suicide ideation/ self-harm code • 1 or more inpatient MH stays in past 5 years  Diagnoses Past Year  Behavioral Health (1)  Most Recent:Schizoaffective Disorder  Most Frequent (# of services):Schizoaffective Disorder(4)  Medical (2)  Most Recent:Encounter for general examination without complaint, suspected or reported diagnosis • Localized adiposity  Most Frequent (# of services):Encounter for general examination without complaint, suspected or reported diagnosis(1) • Localized adiposity(1)  Medications Past YearLast   Paliperidone Palmitate (Invega Trinza) • Antipsychotic8/15/2024Dose: 546 MG/1.75ML, .02/day • Quantity: 1  Outpatient Providers Past YearLast Service Date & Type  [facility]9/27/2024Clinic - Medical Specialty  [facility]9/27/[facility]Cl[facility] - Medical Specialty  [facility]8/19/2024Clinic - MH Specialty (Telehealth)  [facility]8/19/2024Multi-Type Group - Psychiatry (Telehealth)  All Hospital and Crisis Utilization • 5 Years  ER Visits# ProvidersLast ER Visit  6Risrwal79/7/2021 at [facility]  1Substance Use14/4/2021 at [facility]  Inpatient Admissions# ProvidersLast Inpatient Admission  4Mental Bfonug6427/20/2021 at [facility]  Crisis Services# ProvidersLast Crisis Service  No Medicaid claims for this data type in the past 5 years  Brief Overview as of 4/24/2025"

## 2025-04-25 NOTE — ED BEHAVIORAL HEALTH ASSESSMENT NOTE - NSBHATTESTCOMMENTATTENDFT_PSY_A_CORE
[facility] Hospitalists Progress Note    Patient:  Johnathan Baker  YOB: 1967  Date of Service: 4/26/2019  MRN: [id number]   Acct: [de-identified]   Primary Care Physician: ALENA Jama  Advance Directive: Full Code  Admit Date: 4/20/2019       Hospital Day: 6      CHIEF COMPLAINT:     Chief Complaint   Patient presents with    Arm Swelling       4/26/2019 11:41 AM  Subjective / Interval History:   4/26: Pt seen and exmained. Refusing to take IV abx bc of getting poked and bruised. Pt is Contemplating on deciding whether to leave AMA.      04/25: pt seen and examined. Pain is somewhat controlled. No new complaints. Awaiting I/D    04/24/2019  Doing well. Status post bursa aspiration left elbow. No acute overnight event reported patient denies any acute distress or complaints at this time. 04/23/2019  No acute overnight event reported. Patient endorses persistently worsening non tender left elbow swelling. Dinies any other acute complaints or distress at this time. 04/22/2019  Reported worsening Left Elbow swelling. Pain is fairly controlled. No acute overnight event or any other acute complaints reported. Cumulative Hospital History:    As per Initial admission HPI 4/20/2019, quoted below; \"The patient is a 45 y.o. male presents with worsening left elbow pain. He had some surgery on the elbow 3 months ago, but is unsure what he had done (?olecranon bursa resection? ). He has developed swelling, redness, and warmth of the entire left arm\"    Status post aspiration of bursa  - 04/24/2019. Aspirate sent for culture, cell count, crystals. Review of Systems:   Review of Systems  ROS: 14 point review of systems is negative except as specifically addressed above.     DIET CARB CONTROL; Carb Control: 4 carb choices (60 gms)/meal    Intake/Output Summary (Last 24 hours) at 4/26/2019 1141  Last data filed at 4/25/2019 1336  Gross per 24 hour   Intake 24 ml   Output --   Net 24 ml Medications:   dextrose       Current Facility-Administered Medications   Medication Dose Route Frequency Provider Last Rate Last Dose    vancomycin (VANCOCIN) 1,250 mg in dextrose 5 % 250 mL IVPB  1,250 mg Intravenous Q8H Josephine Samayoa MD        lidocaine PF 1 % injection 2 mL  2 mL Intradermal Once Josephine Samayoa MD        ethyl chloride spray   Topical PRN Josephine Samayoa MD        insulin glargine (LANTUS) injection vial 25 Units  25 Units Subcutaneous Nightly Josephine Samayoa MD   25 Units at 04/25/19 2126    insulin lispro (HUMALOG) injection vial 6 Units  6 Units Subcutaneous TID  Josephine Samayoa MD   6 Units at 04/26/19 2023    vancomycin (VANCOCIN) intermittent dosing (placeholder)   Other RX Placeholder Josephine Samayoa MD        glucose (GLUTOSE) 40 % oral gel 15 g  15 g Oral PRN Josephine Samayoa MD        dextrose 50 % solution 12.5 g  12.5 g Intravenous PRN Josephine Samayoa MD        glucagon (rDNA) injection 1 mg  1 mg Intramuscular PRN Josephine Samayoa MD        dextrose 5 % solution  100 mL/hr Intravenous PRN Josephine Samayoa MD        insulin lispro (HUMALOG) injection vial 0-6 Units  0-6 Units Subcutaneous TID  Josephine Samayoa MD   1 Units at 04/25/19 1226    insulin lispro (HUMALOG) injection vial 0-3 Units  0-3 Units Subcutaneous Nightly Josephine Samayoa MD   2 Units at 04/25/19 2127    carvedilol (COREG) tablet 25 mg  25 mg Oral BID  Willye Manoj, DO   25 mg at 04/26/19 1769    citalopram (CELEXA) tablet 40 mg  40 mg Oral Daily Willye Manoj, DO   40 mg at 04/26/19 4359    lamoTRIgine (LAMICTAL) tablet 100 mg  100 mg Oral BID Willye Manoj, DO   100 mg at 04/26/19 0840    naproxen (NAPROSYN) tablet 500 mg  500 mg Oral BID  Willye Manoj, DO   500 mg at 04/26/19 0839    pantoprazole (PROTONIX) tablet 40 mg  40 mg Oral QAM  Willye Manoj, DO   40 mg at 04/26/19 0617    pravastatin (PRAVACHOL) tablet 80 mg  80 mg Oral Nightly Bryanna Diane, DO   80 mg at 04/25/19 2126    QUEtiapine (SEROQUEL) tablet 50 mg  50 mg Oral Nightly Bryanna Diane, DO   50 mg at 04/23/19 2215    tiZANidine (ZANAFLEX) tablet 4 mg  4 mg Oral Q6H PRN Bryanna Diane, DO   4 mg at 04/24/19 0110    sodium chloride flush 0.9 % injection 10 mL  10 mL Intravenous 2 times per day Bryanna Diane, DO   10 mL at 04/25/19 4035    sodium chloride flush 0.9 % injection 10 mL  10 mL Intravenous PRN Bryanna Diane, DO        magnesium hydroxide (MILK OF MAGNESIA) 400 MG/5ML suspension 30 mL  30 mL Oral Daily PRN Bryanna Diane, DO        ondansetron [facility]) injection 4 mg  4 mg Intravenous Q6H PRN Bryanna Diane, DO        enoxaparin (LOVENOX) injection 40 mg  40 mg Subcutaneous Daily Bryanna Diane, DO   40 mg at 04/26/19 0840    acetaminophen (TYLENOL) tablet 650 mg  650 mg Oral Q4H PRN Bryanna Diane, DO   650 mg at 04/24/19 2330         dextrose        vancomycin  1,250 mg Intravenous Q8H    lidocaine PF  2 mL Intradermal Once    insulin glargine  25 Units Subcutaneous Nightly    insulin lispro  6 Units Subcutaneous TID WC    vancomycin (VANCOCIN) intermittent dosing (placeholder)   Other RX Placeholder    insulin lispro  0-6 Units Subcutaneous TID WC    insulin lispro  0-3 Units Subcutaneous Nightly    carvedilol  25 mg Oral BID WC    citalopram  40 mg Oral Daily    lamoTRIgine  100 mg Oral BID    naproxen  500 mg Oral BID WC    pantoprazole  40 mg Oral QAM AC    pravastatin  80 mg Oral Nightly    QUEtiapine  50 mg Oral Nightly    sodium chloride flush  10 mL Intravenous 2 times per day    enoxaparin  40 mg Subcutaneous Daily     ethyl chloride, glucose, dextrose, glucagon (rDNA), dextrose, tiZANidine, sodium chloride flush, magnesium hydroxide, ondansetron, acetaminophen  DIET CARB CONTROL; Carb Control: 4 carb choices (60 gms)/meal       Labs:     Recent Labs     04/24/19 0423 04/25/19 0103 04/26/19 0252   WBC 14.8* 16.7* 13.2*   RBC 4.49* 4.47* 4.49*   HGB 13.6* 13.7* 13.3*   HCT 40.1* 40.9* 40.4*   MCV 89.3 91.5 90.0   MCH 30.3 30.6 29.6   MCHC 33.9 33.5 32.9*    401* 461*     Recent Labs     04/24/19 0423 04/25/19 0103 04/26/19 0252    135* 137   K 3.8 4.2 4.0   ANIONGAP 12 9 12   CL 97* 98 96*   CO2 27 28 29   BUN 9 9 10   CREATININE 0.7 0.7 0.8   GLUCOSE 218* 154* 268*   CALCIUM 9.1 9.6 9.7     No results for input(s): MG, PHOS in the last 72 hours. No results for input(s): AST, ALT, ALB, BILITOT, ALKPHOS, ALB in the last 72 hours. HgBA1c:  No components found for: HGBA1C  FLP:    Lab Results   Component Value Date    TRIG 185 02/11/2019    HDL 28 02/11/2019    LDLCALC 86 02/11/2019     TSH:    Lab Results   Component Value Date    TSH 2.150 02/11/2019     Troponin T: No results for input(s): TROPONINI in the last 72 hours. Pro-BNP: No results for input(s): BNP in the last 72 hours. INR: No results for input(s): INR in the last 72 hours. ABGs: No results found for: PHART, PO2ART, PRR7FHD  UA:No results for input(s): NITRITE, COLORU, PHUR, LABCAST, WBCUA, RBCUA, MUCUS, TRICHOMONAS, YEAST, BACTERIA, CLARITYU, SPECGRAV, LEUKOCYTESUR, UROBILINOGEN, BILIRUBINUR, BLOODU, GLUCOSEU, AMORPHOUS in the last 72 hours. Invalid input(s): Kiara Jacinto      RAD:   Xr Elbow Left (min 3 Views)    Result Date: 4/20/2019  EXAMINATION: XR ELBOW LEFT (MIN 3 VIEWS) 4/20/2019 4:56 PM HISTORY: Left elbow swelling and pain. Report: 3 views of the left elbow were obtained. There are no comparison studies. No fracture or dislocation is identified. There is no joint effusion. There is extensive soft tissue swelling posteriorly over the olecranon region. No soft tissue gas or radiopaque foreign bodies identified. 1. No acute osseous findings.  2. Extensive soft tissue swelling over the posterior olecranon region compatible with cellulitis and possible olecranon bursitis. Signed by Dr Kajal Armstrong on 4/20/2019 4:57 PM    Narrative   EXAMINATION:  CT ELBOW LEFT WO CONTRAST  4/22/2019 3:07 PM   HISTORY: Elbow pain. Assess tendon tear and/or bursitis suspected. COMPARISON: 4/20/2019 elbow radiographs   There is a large olecranon bursitis. The fluid collection along the   olecranon measures approximate 6.4 x 3.4 x 8.5 cm. TECHNIQUE: Radiation dose equals  mGy cm. AUTOMATED EXPOSURE   CONTROL dose reduction technique was implemented. Thin section axial images were obtained. 2-D sagittal and coronal   reconstruction images were generated. FINDINGS:    There is a large fluid collection identified in the olecranon bursa   compatible with bursitis measuring 6.4 x 3.4 x 8.5 cm. Cyst the fluid   collection is low attenuation suggesting simple fluid, old hematoma as   well as infected fluid not excluded. There is no abnormal gas or   complicating features associated with the collection. Additionally there is a large amount of subcutaneous edema extending   diffusely along the proximal forearm as well as the distal arm. The bony structures of the elbow are maintained without fracture. There is no periostitis. There are no blastic or lytic lesions. There   is no evidence of osteomyelitis. Mild degenerative spurring along the   coronoid process observed. No definite intramuscular abnormality observed. There is no CT evidence of significant fat pad displacement or elbow   joint effusion. Evaluation of ligamentous and tendinous structures is suboptimal, MR   is suggested clinically indicated.       Impression   1. Large fluid collection the olecranon bursa differential   considerations include bursitis. 2. Diffuse superficial edema extending into the forearm as well as the   proximal arm. 3. No acute osseous abnormality.    Signed by Dr Dave Ozuna on 4/22/2019 3:17 PM             Objective:   Vitals: /70   Pulse 99   Temp 98.3 °F (36.8 °C) Resp 18   Ht 5' 7\" (1.702 m)   Wt 214 lb 12.8 oz (97.4 kg)   SpO2 99%   BMI 33.64 kg/m²   24HR INTAKE/OUTPUT:      Intake/Output Summary (Last 24 hours) at 4/26/2019 1141  Last data filed at 4/25/2019 1336  Gross per 24 hour   Intake 24 ml   Output --   Net 24 ml        Physical Exam   Nursing note and vitals reviewed. General appearance: alert and cooperative with exam  HEENT: atraumatic, eyes with clear conjunctiva and normal lids, pupils and irises normal, external ears and nose are normal, lips normal.  Neck without masses, lympadenopathy, bruit, thyroid normal  Lungs: Patient in no acute respiratory distress, no increased work of breathing, \"clear to auscultation bilaterally\" without rales, rhonchi or wheezes  Heart: regular rate and rhythm, S1, S2 normal, no murmur, click, rub or gallop  Abdomen: soft, non-tender; non-distended normal bowel sounds no masses, no organomegaly  Extremities: notender Left elbow with erythema and edema. extremities normal, atraumatic, no cyanosis or edema  Neurologic: No focal neurologic deficits, normal sensation, alert and oriented, affect and mood appropriate.  CN II-XII Intact  Skin: Skin color, texture, turgor normal. No rashes or lesions        Assessment/plan:     Patient Active Problem List    Diagnosis Date Noted    Olecranon bursitis of left elbow 04/23/2019    Hypertension     Hyperlipidemia     GERD (gastroesophageal reflux disease)     Diabetes mellitus ([facility] [address]      # History of Type 2 Diabetes Mellitus   · - Uncontrolled  · - Hemoglobin A1c 9.4% (04/21/2019)  · - Reported Home regimen include,   · --> Metformin  · - Holding home PO Med while inpatient  ·   · Inpatient Regimens to include;  · - Increase basal - Insulin Glargine (Lantus) 25 units subcu nightly  · - Increase pre-meal - Insulin Lispro (Humalog) 6 units subcu pre-meal 3 times a day  · - Insulin Lispro (Humalog) on a low dose sliding scale  · - Continue to Monitor POC glucose, and adjusts insulin medical conditions - see above and orders.           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 Hidalgo at [facility]; adherent with Invega Trinza 546 mg q3mo FORD (last 2/11/25); no known suicidality or violence; no legal issues; no active substance use (CBD gummies 1 yr ago); and no significant PMH who was brought in by his mother due to 1 week of delusions of being poisoned, thought disorganization, psychotic guilt, and mood lability in the context of being due for next FORD dose on 5/6/25.    On interview, pt presents w/ sx of acute psychosis, evidenced by somatic delusions (of poison causing gangrene and seizure d/o), paranoid delusions, bizarre, unpredictable and impulsive behavior in the community (left house abruptly), and disorganized thought process on evaluation, in context of underlying primary Axis I d/o with seeming acute decompensation of same at this time. Patient is seeking and meets criteria for voluntary inpatient psychiatric admission for sx stabilization and medication optimization. Remainder of recommendations as per above.

## 2025-04-25 NOTE — ED PROVIDER NOTE - CLINICAL SUMMARY MEDICAL DECISION MAKING FREE TEXT BOX
Labs, Urine Tox/UA, EKG. PO hydration 1 -2 liter. Check CK  level for trend . Encourage PO hydration.  small superficial opening on plantar surface of right foot . Bacitracin to area.    Medical evaluation performed. There is no clinical evidence of intoxication or any acute medical problem requiring immediate intervention. Patient is awaiting psychiatric consultation. Final disposition will be determined by psychiatrist.

## 2025-04-25 NOTE — ED ADULT NURSE NOTE - CHIEF COMPLAINT QUOTE
pt sent in by mother c/o mood swings and bizarre behavior x1 week. disorganized in conversation. hx. schizophrenia.(compliant with meds). pt denies SI. HI, hallucinations, drug or alcohol use. Mother Berkley [phone]

## 2025-04-25 NOTE — ED BEHAVIORAL HEALTH ASSESSMENT NOTE - DESCRIPTION
Lives w/ parents in [place]. Working as a temp. Calm and cooperative.    T(C): 37 (04-25-25 @ 19:12), Max: 37 (04-25-25 @ 19:12)  T(F): 98.6 (04-25-25 @ 19:12), Max: 98.6 (04-25-25 @ 19:12)  HR: 86 (04-25-25 @ 19:12) (86 - 86)  BP: 130/66 (04-25-25 @ 19:12) (130/66 - 130/66)  RR: 16 (04-25-25 @ 19:12) (16 - 16)  SpO2: 100% (04-25-25 @ 19:12) (100% - 100%)  Wt(kg): -- Lives w/ parents in [place]. 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

## 2025-04-26 PROCEDURE — 99222 1ST HOSP IP/OBS MODERATE 55: CPT

## 2025-04-26 RX ORDER — INFLUENZA A VIRUS A/IDAHO/07/2018 (H1N1) ANTIGEN (MDCK CELL DERIVED, PROPIOLACTONE INACTIVATED, INFLUENZA A VIRUS A/INDIANA/08/2018 (H3N2) ANTIGEN (MDCK CELL DERIVED, PROPIOLACTONE INACTIVATED), INFLUENZA B VIRUS B/SINGAPORE/INFTT-16-0610/2016 ANTIGEN (MDCK CELL DERIVED, PROPIOLACTONE INACTIVATED), INFLUENZA B VIRUS B/IOWA/06/2017 ANTIGEN (MDCK CELL DERIVED, PROPIOLACTONE INACTIVATED) 15; 15; 15; 15 UG/.5ML; UG/.5ML; UG/.5ML; UG/.5ML
0.5 INJECTION, SUSPENSION INTRAMUSCULAR ONCE
Refills: 0 | Status: DISCONTINUED | OUTPATIENT
Start: 2025-04-26 | End: 2025-05-02

## 2025-04-26 RX ADMIN — Medication 2 MILLIGRAM(S): at 11:11

## 2025-04-26 RX ADMIN — Medication 1 APPLICATION(S): at 20:22

## 2025-04-26 RX ADMIN — HALOPERIDOL 5 MILLIGRAM(S): 10 TABLET ORAL at 11:12

## 2025-04-26 RX ADMIN — PALIPERIDONE 3 MILLIGRAM(S): 9 TABLET, EXTENDED RELEASE ORAL at 20:22

## 2025-04-26 NOTE — BH PATIENT PROFILE - HOME MEDICATIONS
Russ Sustenna 156 mg/mL intramuscular suspension, extended release , 156 milligram(s) intramuscularly every 4 weeks; given 7/14/21; NEXT DUE: 8/11/21  lithium 450 mg oral tablet, extended release , 2.5 tab(s) orally once a day (at bedtime)   Pristiq 50 mg oral tablet, extended release , 1 tab(s) orally once a day (at bedtime)

## 2025-04-26 NOTE — BH INPATIENT PSYCHIATRY ASSESSMENT NOTE - NSBHCHARTREVIEWLAB_PSY_A_CORE FT
Admission labs reviewed no acute findings  utox unremarkable  BAL <10   UA unremarkable  TSH 0.83  Creatine Kinase 621

## 2025-04-26 NOTE — BH INPATIENT PSYCHIATRY ASSESSMENT NOTE - NSBHCHARTREVIEWVS_PSY_A_CORE FT
Vital Signs Last 24 Hrs  T(C): 36.3 (04-26-25 @ 08:33), Max: 37 (04-25-25 @ 19:12)  T(F): 97.4 (04-26-25 @ 08:33), Max: 98.6 (04-25-25 @ 19:12)  HR: 86 (04-25-25 @ 19:12) (86 - 86)  BP: 130/66 (04-25-25 @ 19:12) (130/66 - 130/66)  BP(mean): --  RR: 16 (04-25-25 @ 19:12) (16 - 16)  SpO2: 100% (04-25-25 @ 19:12) (100% - 100%)    Orthostatic VS  04-26-25 @ 08:33  Lying BP: --/-- HR: --  Sitting BP: 135/65 HR: 87  Standing BP: 138/84 HR: 88  Site: --  Mode: --  Orthostatic VS  04-26-25 @ 01:25  Lying BP: --/-- HR: --  Sitting BP: 119/78 HR: 84  Standing BP: 115/80 HR: 94  Site: --  Mode: --   Vital Signs Last 24 Hrs  T(C): 36.3 (04-26-25 @ 08:33), Max: 36.4 (04-26-25 @ 01:25)  T(F): 97.4 (04-26-25 @ 08:33), Max: 97.5 (04-26-25 @ 01:25)  HR: --  BP: --  BP(mean): --  RR: --  SpO2: --    Orthostatic VS  04-26-25 @ 21:59  Lying BP: --/-- HR: --  Sitting BP: 132/76 HR: 79  Standing BP: 127/80 HR: 90  Site: --  Mode: --  Orthostatic VS  04-26-25 @ 08:33  Lying BP: --/-- HR: --  Sitting BP: 135/65 HR: 87  Standing BP: 138/84 HR: 88  Site: --  Mode: --  Orthostatic VS  04-26-25 @ 01:25  Lying BP: --/-- HR: --  Sitting BP: 119/78 HR: 84  Standing BP: 115/80 HR: 94  Site: --  Mode: --

## 2025-04-26 NOTE — BH INPATIENT PSYCHIATRY ASSESSMENT NOTE - CURRENT MEDICATION
MEDICATIONS  (STANDING):  bacitracin   Ointment 1 Application(s) Topical every 12 hours  influenza   Vaccine 0.5 milliLiter(s) IntraMuscular once  LORazepam   Injectable 2 milliGRAM(s) IntraMuscular Once  paliperidone ER. 3 milliGRAM(s) Oral at bedtime    MEDICATIONS  (PRN):  acetaminophen     Tablet .. 650 milliGRAM(s) Oral every 6 hours PRN Temp greater or equal to 38C (100.4F), Mild Pain (1 - 3), Moderate Pain (4 - 6)  aluminum hydroxide/magnesium hydroxide/simethicone Suspension 30 milliLiter(s) Oral every 4 hours PRN Dyspepsia  haloperidol     Tablet 5 milliGRAM(s) Oral every 6 hours PRN agitation 2/2 psychosis  haloperidol    Injectable 5 milliGRAM(s) IntraMuscular once PRN Agitation  LORazepam     Tablet 2 milliGRAM(s) Oral every 6 hours PRN agitation 2/2 psychosis  melatonin 3 milliGRAM(s) Oral at bedtime PRN Insomnia

## 2025-04-26 NOTE — BH INPATIENT PSYCHIATRY ASSESSMENT NOTE - NSDCCRITERIA_PSY_ALL_CORE
Symptom Stabilization  Optimize medications  CGI<=3  Outpatient services f/u   FORD due to chronic noncompliance

## 2025-04-26 NOTE — BH INPATIENT PSYCHIATRY ASSESSMENT NOTE - ATTENDING COMMENTS
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 Multiple prior admissions ([place] [address] 7/5-7/20/21; [facility] 5/12-5/25/2021; [facility] 2017); follows with Dr. Hidalgo at [facility] BOOST clinic; on Invega Trinza 546 mg q3mo FORD (last 2/11/25). Patient was brought in by his mother due to 1 week of delusions of being poisoned, thought disorganization, psychotic guilt, and mood lability in the context of being due for next FORD dose on 5/6/25. Patient is hospitalized with a primary problem of psychotic decompensation.  Patient admitted to  on a 9.13 legal status. patient requires inpatient hospitalization due to symptoms of mental illness so severe that they significantly interfere with activities of daily living, and presents a potential danger to self as a result of psychotic decompensation. He is requiring 24-hour care at this time as a result, for psychiatric stabilization and safety.  Agree with admission plan and   - No indication for 1:1 CO as pt denies SI or HI  - PRNs: Haldol 5 mg + Ativan 2 mg PO/IM q6h as needed for agitation  - Start paliperidone 3 mg HS for psychosis  - Pt due for Invega Trinza 546 mg on 5/6/25 - consider switching to Invega Sustenna if pt is decompensating at end of 3 month FORD period  - Medical: Denies PMH or home meds. Sole of R foot with abrasion, no signs of infection - can apply bacitracin per medicine. CK with mild elevation, recommend oral hydration and consider repeating with next labs

## 2025-04-26 NOTE — PSYCHIATRIC REHAB INITIAL EVALUATION - NSBHPROFILEREVIEW_PSY_ALL_CORE
Keep the numbers for these national suicide hotlines nearby: 0-838-691-TALK  (3-952.728.3508) and 0-570-NFIHTOT (2-108.502.3635). If you or someone you know  talks about suicide or feeling hopeless, get help right away. Yes

## 2025-04-26 NOTE — PSYCHIATRIC REHAB INITIAL EVALUATION - NSBHPRRECOMMEND_PSY_ALL_CORE
Writer met with patient to orient patient to unit 2N as well as the role/function of Activities Specialists and Inpatient Psychiatric Rehabilitation programming. Patient demonstrated full engagement with writer during initial session, presenting as appropriately dressed and groomed with an confused mood. Patient was unable to identify an appropriate rehabilitation goal. Writer therefore identified an appropriate goal for patient which is to Psychiatric Rehabilitation staff will meet with patient weekly to review progress towards goal. Writer met with patient to orient patient to unit 2N as well as the role/function of Activities Specialists and Inpatient Psychiatric Rehabilitation programming. Patient demonstrated full engagement with writer during initial session, presenting as appropriately dressed and groomed with an confused mood. Patient was unable to identify an appropriate rehabilitation goal. Writer therefore identified an appropriate goal for patient which is to be able o report experiencing hallucinations to staff. Psychiatric Rehabilitation staff will meet with patient weekly to review progress towards goal.

## 2025-04-26 NOTE — BH INPATIENT PSYCHIATRY ASSESSMENT NOTE - NSBHASSESSSUMMFT_PSY_ALL_CORE
Patient is a 29-year-old AA single male. 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 Patient has PPH of Schizophrenia Disorder vs schizoaffective disorder-bipolar type. Multiple prior admissions ([place] [address] 7/5-7/20/21; [facility] 5/12-5/25/2021; [facility] 2017); follows with Dr. Hidalgo at [facility] BOOST clinic; on Invega Trinza 546 mg q3mo FORD (last 2/11/25). Patient was brought in by his mother due to 1 week of delusions of being poisoned, thought disorganization, psychotic guilt, and mood lability in the context of being due for next FORD dose on 5/6/25. Patient is hospitalized with a primary problem of psychotic decompensation.  Patient admitted to  on a 9.13 legal status. patient requires inpatient hospitalization due to symptoms of mental illness so severe that they significantly interfere with activities of daily living, and presents a potential danger to self as a result of psychotic decompensation. He is requiring 24-hour care at this time as a result, for psychiatric stabilization and safety.    PLAN  - [facility]  - No indication for 1:1 CO as pt denies SI or HI  - PRNs: Haldol 5 mg + Ativan 2 mg PO/IM q6h as needed for agitation  - Start paliperidone 3 mg HS for psychosis  - Pt due for Invega Trinza 546 mg on 5/6/25 - consider switching to Invega Sustenna if pt is decompensating at end of 3 month FORD period  - Medical: Denies PMH or home meds. Sole of R foot with abrasion, no signs of infection - can apply bacitracin per medicine. CK with mild elevation, recommend oral hydration and consider repeating with next labs  >Labs: Admission labs reviewed, no acute findings. U-tox negative.  Labs pending for tomorrow: A1c and Lipid panel, and repeat CK. Hold antipsychotics if QTc >500  >Diet: Regular  >Social: milieu/structured therapy  >Treatment Interventions: Groups and Individual Therapy/CBT, Motivational counseling for substance abuse related issues.   >Dispo: Collateral and dispo planning pending further symptom and medication optimization

## 2025-04-26 NOTE — BH PATIENT PROFILE - EQUIPMENT CURRENTLY USED AT HOME
START Aspirin 81 once a day  START Magnesium Glycinate (over the counter) once a day  START Rosuvastatin 10 mg once a day  You will be scheduled for a cardiac cath possible angioplasty (stent)  Lab work to be done prior to cath  If your heart cath is negative, will refer you to Pulmonary    Ranexa 500 mg and Metoprolol tartrate 25 mg will be given to you on admit to the hospital    Continue same medications/treatment.  Patient educated on proper medication use.  Patient educated on risk factor modification.  Please bring any lab results from other providers / physicians to your next appointment.    Please bring all medicines, vitamins and herbal supplements with you when you come to the office.    Prescriptions will not be filled unless you are compliant with your follow up appointments or have a follow up  appointment scheduled as per instruction of your physician.  Refills should be requested at the time of  Your visit.    Belem GAMEZ LPN, am scribing for and in the presence of Dr. Cristhian Clements MD, [facility]           no

## 2025-04-26 NOTE — BH INPATIENT PSYCHIATRY ASSESSMENT NOTE - HPI (INCLUDE ILLNESS QUALITY, SEVERITY, DURATION, TIMING, CONTEXT, MODIFYING FACTORS, ASSOCIATED SIGNS AND SYMPTOMS)
Patient was seen and evaluated, chart, medications and labs reviewed. Case discussed with nursing team.  On service for this 29-year-old AA single male. 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 Patient has PPH of Schizophrenia Disorder vs schizoaffective disorder-bipolar type. Multiple prior admissions ([place] [address] -21; [facility] -2021; [facility] ); follows with Dr. Hidalgo at [facility]; on Invega Trinza 546 mg q3mo FORD (last 25). Patient was brought in by his mother due to 1 week of delusions of being poisoned, thought disorganization, psychotic guilt, and mood lability in the context of being due for next FORD dose on 25. Patient is hospitalized with a primary problem of psychotic decompensation.  Patient admitted to  on a 9.13 legal status. I have reviewed the initial psychiatric assessment in the electronic medical record, including the history of present illness, past psychiatric history, family/social history (no pertinent changes), and exam, and have confirmed the salient findings dated 25.  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 Hidalgo at [facility]; adherent with Invega Trinza 546 mg q3mo FORD (last 25); no known suicidality or violence; no legal issues; no active substance use (CBD gummies 1 yr ago); and no significant PMH who was brought in by his mother due to 1 week of delusions of being poisoned, thought disorganization, psychotic guilt, and mood lability in the context of being due for next FORD dose on 25. In the ED, patient is calm but interview is limited by thought disorder. 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 He reports feeling "childish" because he plays video games, asks his mom to help with his meals, and is not working. He repeatedly makes statements like "I'm such as idiot" and "I should have known that."  He also believes that he has been getting sick in multiple ways, primarily by being poisoned by something in his home and developing gangrene in his mouth and lips. He endorses delusions that a gas leak, poison in his water, or household cleaning sprays could be the cause of his gangrene. Patient also believes he has a seizure disorder because he feels weak at times and his mother has seizures. He believes that his saliva could poison others, so he offers to wear a mask during the interview. Pt states he has not been eating or drinking much as he is not sure where the poison is coming from. He also endorses paranoia that his mother is talking about him to her friend Katerine, who he suspects works at [employer] (pt met RN named Katerine in  area). He denies any fear that others will harm him. He does report passing thought in the ambulance today that he should rape someone in order to be punished severely, as he believes he is a bad person. He denies having any specific person in mind to rape/assault and reports no intent/plan to act on this thought. He endorses AH of music notes but denies any CAH or voices.  Pt denies VH, but does make bizarre statements such as that he saw a woman giving birth in the ED before being brought to .  He states, "I don't know why I'd be here as a man in a woman's hospital, but it's okay because I need help." Reports poor sleep due to "making music" and having conversations with friends. Pt states he has been going on walks daily up to 50 miles/day to get space from his family. He endorses hopelessness and passive SI, but denies active SIIP, SIB, or suicide attempts. On ROS, pt denies elevated/irritable mood, increased energy/activity, grandiosity, substance use, h/o complicated withdrawal.    On unit: information came from chart review and patient interview:  In today’s interview patient reported his mood to be “I’m upset because I cant do the things I need and I feel I am a failure”  Patient reports continued mood dysregulation, anxiety. He currently denies SI/SIB/HI, but reports recent passive SI for several weeks, no plan or intent on unit and engages in safety planning.   Patient presents bizarre, paranoid guarded and delusional. Patient believes that he has been poisoned by something in his home and has gangrene in his mouth and lips. Endorses delusions of a gas leak, and poison in his water. He believes that his saliva could poison others.  He endorses AH of music notes but denies any CAH. Reports he walked for 12 hours ([place] [address]) “I walked [place] the borough[place] I wanted to sink into the ocean” when pt is asked why he replies “I was living a disgusting life” stated that his feet hurt from walking, shows NP his right plantar, has callous, reports that it is painful.  He denies history of aggression or violence. No access to firearm. Patient denies any symptoms suggestive of active jasmin: (denied grandiosity/ racing thoughts/ increased goal directed activities or engaged in risk taking behavior/ no pressured speech/ no elevated mood/ denied any increased in energy level causing sleep disruption), no signs of catatonia (with no signs of mutism, negativism, stereotypy, echolalia, echopraxia, posturing or rigidity. He was alert and able to answer appropriately to questions during exam. denies obsessive, intrusive and persistent thoughts or compulsive, ritualistic acts are reported. Patient denies any active legal issues, is not currently under any kind of court supervision.  Denies substance use, h/o CBD edibles, reports last in , admitting u-tox negative.  No tobacco/vaping.  Denies any other non-substance addictive behaviors (sex addiction, gambling addiction, overspending/oniomania, compulsive overeating).   No PMH. When asked about medical issues pt reports “my skin burns all the time since Covid”

## 2025-04-26 NOTE — BH INPATIENT PSYCHIATRY ASSESSMENT NOTE - RISK ASSESSMENT
The patient is at chronically elevated risk of self harm and suicide. Risk factors include recent passive SI, psychosis, history of treatment nonadherence, hx of inpatient hospitalizations. Protective factors include no active SI/I/P on unit pt help seeking, stable housing, supportive housing. Overall, the patient is at elevated acute risk of harm to self and meets criteria for psychiatric hospitalization for safety and stabilization. Risk will be mitigated by admission to psychiatric hospital.

## 2025-04-26 NOTE — BH INPATIENT PSYCHIATRY ASSESSMENT NOTE - NSBHMETABOLIC_PSY_ALL_CORE_FT
BMI: BMI (kg/m2): 35 (04-26-25 @ 01:25)  HbA1c:   Glucose:   BP: 130/66 (04-25-25 @ 19:12) (130/66 - 130/66)Vital Signs Last 24 Hrs  T(C): 36.3 (04-26-25 @ 08:33), Max: 37 (04-25-25 @ 19:12)  T(F): 97.4 (04-26-25 @ 08:33), Max: 98.6 (04-25-25 @ 19:12)  HR: 86 (04-25-25 @ 19:12) (86 - 86)  BP: 130/66 (04-25-25 @ 19:12) (130/66 - 130/66)  BP(mean): --  RR: 16 (04-25-25 @ 19:12) (16 - 16)  SpO2: 100% (04-25-25 @ 19:12) (100% - 100%)    Orthostatic VS  04-26-25 @ 08:33  Lying BP: --/-- HR: --  Sitting BP: 135/65 HR: 87  Standing BP: 138/84 HR: 88  Site: --  Mode: --  Orthostatic VS  04-26-25 @ 01:25  Lying BP: --/-- HR: --  Sitting BP: 119/78 HR: 84  Standing BP: 115/80 HR: 94  Site: --  Mode: --    Lipid Panel:  BMI: BMI (kg/m2): 35 (04-26-25 @ 01:25)  HbA1c:   Glucose:   BP: 130/66 (04-25-25 @ 19:12) (130/66 - 130/66)Vital Signs Last 24 Hrs  T(C): 36.3 (04-26-25 @ 08:33), Max: 36.4 (04-26-25 @ 01:25)  T(F): 97.4 (04-26-25 @ 08:33), Max: 97.5 (04-26-25 @ 01:25)  HR: --  BP: --  BP(mean): --  RR: --  SpO2: --    Orthostatic VS  04-26-25 @ 21:59  Lying BP: --/-- HR: --  Sitting BP: 132/76 HR: 79  Standing BP: 127/80 HR: 90  Site: --  Mode: --  Orthostatic VS  04-26-25 @ 08:33  Lying BP: --/-- HR: --  Sitting BP: 135/65 HR: 87  Standing BP: 138/84 HR: 88  Site: --  Mode: --  Orthostatic VS  04-26-25 @ 01:25  Lying BP: --/-- HR: --  Sitting BP: 119/78 HR: 84  Standing BP: 115/80 HR: 94  Site: --  Mode: --    Lipid Panel:

## 2025-04-26 NOTE — BH INPATIENT PSYCHIATRY ASSESSMENT NOTE - OTHER PAST PSYCHIATRIC HISTORY (INCLUDE DETAILS REGARDING ONSET, COURSE OF ILLNESS, INPATIENT/OUTPATIENT TREATMENT)
No Per pt, patient has history of primary psychotic disorder, schizophrenia vs schizoaffective disorder. 1st inpatient hospitalization in 2017. 2 prior inpatient hospitalizations. Last in 5/12-25/2021 at [facility], the [place] [id number]

## 2025-04-27 LAB
A1C WITH ESTIMATED AVERAGE GLUCOSE RESULT: 5.3 % — SIGNIFICANT CHANGE UP (ref 4–5.6)
CHOLEST SERPL-MCNC: 183 MG/DL — SIGNIFICANT CHANGE UP
CK SERPL-CCNC: 598 U/L — HIGH (ref 30–200)
ESTIMATED AVERAGE GLUCOSE: 105 — SIGNIFICANT CHANGE UP
HDLC SERPL-MCNC: 76 MG/DL — SIGNIFICANT CHANGE UP
LDLC SERPL-MCNC: 96 MG/DL — SIGNIFICANT CHANGE UP
LIPID PNL WITH DIRECT LDL SERPL: 96 MG/DL — SIGNIFICANT CHANGE UP
NONHDLC SERPL-MCNC: 107 MG/DL — SIGNIFICANT CHANGE UP
TRIGL SERPL-MCNC: 53 MG/DL — SIGNIFICANT CHANGE UP

## 2025-04-27 PROCEDURE — 99232 SBSQ HOSP IP/OBS MODERATE 35: CPT

## 2025-04-27 RX ADMIN — Medication 1 APPLICATION(S): at 08:48

## 2025-04-27 RX ADMIN — PALIPERIDONE 3 MILLIGRAM(S): 9 TABLET, EXTENDED RELEASE ORAL at 21:19

## 2025-04-27 RX ADMIN — Medication 3 MILLIGRAM(S): at 00:12

## 2025-04-27 NOTE — BH INPATIENT PSYCHIATRY PROGRESS NOTE - NSBHFUPINTERVALHXFT_PSY_A_CORE
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 Multiple prior admissions ([place] [id number] 7/5-7/20/21; [facility] 5/12-5/25/2021; [facility] 2017); follows with Dr. Hidalgo at [facility] BOOST clinic; on Invega Trinza 546 mg q3mo FORD (last 2/11/25). Patient was brought in by his mother due to 1 week of delusions of being poisoned, thought disorganization, psychotic guilt, and mood lability in the context of being due for next FORD dose on 5/6/25. Patient is hospitalized with a primary problem of psychotic decompensation.   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 Multiple prior admissions ([place] [address] 7/5-7/20/21; [facility] 5/12-5/25/2021; [facility] 2017); follows with Dr. Hidalgo at [facility] BOOST clinic; on Invega Trinza 546 mg q3mo FORD (last 2/11/25). Patient was brought in by his mother due to 1 week of delusions of being poisoned, thought disorganization, psychotic guilt, and mood lability in the context of being due for next FORD dose on 5/6/25. Patient is hospitalized with a primary problem of psychotic decompensation.    Patient was seen for disorganization/psychosis.  Chart, and medications reviewed.  Patient is discussed with nursing team. Remains compliant with medications, no SE reported. No behavioral concerns, no prns for aggression.   Patient is seen in dayroom.  He is calm and cooperative.  He presents disorganization.  Patient reports feeling “ok, but I’m here infecting everyone” pt reports that he believes he has long covid.  Patient reports denies SI/SIB/HI, no plan or intent on unit.   Patient presents bizarre, paranoid and delusional. Patient believes that he has been poisoned by something in his home and has gangrene in his mouth and lips. Endorses delusions of a gas leak, and poison in his water. He believes that his saliva could poison others.  He endorses AH denies any CAH.

## 2025-04-27 NOTE — BH INPATIENT PSYCHIATRY PROGRESS NOTE - NSBHASSESSSUMMFT_PSY_ALL_CORE
Patient is a 29-year-old AA single male. 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 Patient has PPH of Schizophrenia Disorder vs schizoaffective disorder-bipolar type. Multiple prior admissions ([place] [address] 7/5-7/20/21; [facility] 5/12-5/25/2021; [facility] 2017); follows with Dr. Hidalgo at [facility] BOOST clinic; on Invega Trinza 546 mg q3mo FORD (last 2/11/25). Patient was brought in by his mother due to 1 week of delusions of being poisoned, thought disorganization, psychotic guilt, and mood lability in the context of being due for next FORD dose on 5/6/25. Patient is hospitalized with a primary problem of psychotic decompensation.  Patient admitted to  on a 9.13 legal status. patient requires inpatient hospitalization due to symptoms of mental illness so severe that they significantly interfere with activities of daily living, and presents a potential danger to self as a result of psychotic decompensation. He is requiring 24-hour care at this time as a result, for psychiatric stabilization and safety.    PLAN  - [facility]  - No indication for 1:1 CO as pt denies SI or HI  - PRNs: Haldol 5 mg + Ativan 2 mg PO/IM q6h as needed for agitation  - Start paliperidone 3 mg HS for psychosis  - Pt due for Invega Trinza 546 mg on 5/6/25 - consider switching to Invega Sustenna if pt is decompensating at end of 3 month FORD period  - Medical: Denies PMH or home meds. Sole of R foot with abrasion, no signs of infection - can apply bacitracin per medicine. CK with mild elevation, recommend oral hydration and consider repeating with next labs  >Labs: Admission labs reviewed, no acute findings. U-tox negative.  Labs pending for tomorrow: A1c and Lipid panel, and repeat CK. Hold antipsychotics if QTc >500  >Diet: Regular  >Social: milieu/structured therapy  >Treatment Interventions: Groups and Individual Therapy/CBT, Motivational counseling for substance abuse related issues.   >Dispo: Collateral and dispo planning pending further symptom and medication optimization

## 2025-04-27 NOTE — BH INPATIENT PSYCHIATRY PROGRESS NOTE - ATTENDING COMMENTS
Patient is a 29-year-old single man, domiciled in private residence with parents and sister in 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 Multiple prior admissions ([place] [address] 7/5-7/20/21; [facility] 5/12-5/25/2021; [facility] 2017); follows with Dr. Hidalgo at [facility] BOOST clinic; on Invega Trinza 546 mg q3mo FORD (last 2/11/25). Patient was brought in by his mother due to 1 week of delusions of being poisoned, thought disorganization, psychotic guilt, and mood lability in the context of being due for next FORD dose on 5/6/25. Patient is hospitalized with a primary problem of psychotic decompensation.  Patient admitted to  on a 9.13 legal status. patient requires inpatient hospitalization due to symptoms of mental illness so severe that they significantly interfere with activities of daily living, and presents a potential danger to self as a result of psychotic decompensation. He is requiring 24-hour care at this time as a result, for psychiatric stabilization and safety.  Agree with admission plan and   - No indication for 1:1 CO as pt denies SI or HI  - PRNs: Haldol 5 mg + Ativan 2 mg PO/IM q6h as needed for agitation  - Start paliperidone 3 mg HS for psychosis  - Pt due for Invega Trinza 546 mg on 5/6/25 - consider switching to Invega Sustenna if pt is decompensating at end of 3 month FORD period  - Medical: Denies PMH or home meds. Sole of R foot with abrasion, no signs of infection - can apply bacitracin per medicine. CK with mild elevation, recommend oral hydration and consider repeating with next labs

## 2025-04-28 PROCEDURE — 99232 SBSQ HOSP IP/OBS MODERATE 35: CPT

## 2025-04-28 RX ADMIN — PALIPERIDONE 3 MILLIGRAM(S): 9 TABLET, EXTENDED RELEASE ORAL at 20:53

## 2025-04-28 RX ADMIN — Medication 1 APPLICATION(S): at 20:53

## 2025-04-28 NOTE — BH INPATIENT PSYCHIATRY PROGRESS NOTE - NSBHASSESSSUMMFT_PSY_ALL_CORE
Patient is a 29-year-old AA single male. 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 Patient has PPH of Schizophrenia Disorder vs schizoaffective disorder-bipolar type. Multiple prior admissions ([place] [id number] 7/5-7/20/21; [facility] 5/12-5/25/2021; [facility] 2017); follows with Dr. Hidalgo at [facility] clinic; on Invega Trinza 546 mg q3mo FORD (last 2/11/25). Patient was brought in by his mother due to 1 week of delusions of being poisoned, thought disorganization, psychotic guilt, and mood lability in the context of being due for next FORD dose on 5/6/25. Patient is hospitalized with a primary problem of psychotic decompensation.  Patient admitted to  on a 9.13 legal status. patient requires inpatient hospitalization due to symptoms of mental illness so severe that they significantly interfere with activities of daily living, and presents a potential danger to self as a result of psychotic decompensation. He is requiring 24-hour care at this time as a result, for psychiatric stabilization and safety.    PLAN  - [facility]  - No indication for 1:1 CO as pt denies SI or HI  - PRNs: Haldol 5 mg + Ativan 2 mg PO/IM q6h as needed for agitation  - Start paliperidone 3 mg HS for psychosis  - Pt due for Invega Trinza 546 mg on 5/6/25 - Will consider switching to Invega Sustenna monthly FORD for improved efficacy.    - Medical: Denies PMH or home meds. Sole of R foot with abrasion, no signs of infection - can apply bacitracin per medicine. CK with mild elevation, recommend oral hydration and consider repeating with next labs  >Labs: Admission labs reviewed, no acute findings. U-tox negative.  Labs pending for tomorrow: A1c and Lipid panel, and repeat CK. Hold antipsychotics if QTc >500  >Diet: Regular  >Social: milieu/structured therapy  >Treatment Interventions: Groups and Individual Therapy/CBT, Motivational counseling for substance abuse related issues.   >Dispo: Collateral and dispo planning pending further symptom and medication optimization   Patient is a 29-year-old AA single male. 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 Patient has PPH of Schizophrenia Disorder vs schizoaffective disorder-bipolar type. Multiple prior admissions ([place] [id number] 7/5-7/20/21; [facility] 5/12-5/25/2021; [facility] 2017); follows with Dr. Hidalgo at [facility]; on Invega Trinza 546 mg q3mo FORD (last 2/11/25). Patient was brought in by his mother due to 1 week of delusions of being poisoned, thought disorganization, psychotic guilt, and mood lability in the context of being due for next FORD dose on 5/6/25. Patient is hospitalized with a primary problem of psychotic decompensation.  Patient admitted to  on a 9.13 legal status. patient requires inpatient hospitalization due to symptoms of mental illness so severe that they significantly interfere with activities of daily living, and presents a potential danger to self as a result of psychotic decompensation. He is requiring 24-hour care at this time as a result, for psychiatric stabilization and safety.    Patient presents with primarily negative sxs of psychosis on [facility] characterized by constricted/flat affect, anhedonia, avolition, and social withdrawal, but reported history also demonstrates worsening paranoia, thought disorganization, and self-reported incident of increased aggression at home. Differential includes decompensated schizophrenia iso poor efficacy of FORD versus schizoaffective disorder with current depressive episode. Patient is adherent to Invega Trinza, last dose 2/11/25, so there is the question of drop in clinical efficacy prior to next scheduled injection. Patient will likely benefit from receiving FORD at shorter, monthly intervals to ensure consistent receptor saturation and greater efficacy of treatment.    PLAN  - [facility]  - No indication for 1:1 CO as pt denies SI or HI  - PRNs: Haldol 5 mg + Ativan 2 mg PO/IM q6h as needed for agitation  - Start paliperidone 3 mg HS for psychosis  - Pt due for Invega Trinza 546 mg on 5/6/25 - Will consider switching to Invega Sustenna monthly FORD for improved efficacy.  -Patient currently follows with Dr. Hidalgo at [facility], but also reports virtual/telephone visit with psych NP, Rochelle Rodrigues @ [facility]. Patient reports wanting to transition care to [facility], due to proximity to home, and is amenable to going back to Invega sustenna monthly injections.    - Medical: Denies PMH or home meds. Sole of R foot with abrasion, no signs of infection - can apply bacitracin per medicine. CK with mild elevation, recommend oral hydration and consider repeating with next labs  >Labs: Admission labs reviewed, no acute findings. U-tox negative.  Labs pending for tomorrow: A1c and Lipid panel, and repeat CK. Hold antipsychotics if QTc >500  >Diet: Regular  >Social: milieu/structured therapy  >Treatment Interventions: Groups and Individual Therapy/CBT, Motivational counseling for substance abuse related issues.   >Dispo: Collateral and dispo planning pending further symptom and medication optimization

## 2025-04-28 NOTE — ADVANCED PRACTICE NURSE CONSULT - ASSESSMENT
Patient was seen in presence of primary nurse.  Patient reports that he has foot wounds that were managed by Podiatrist which sustained from walking too much.  On Assessment skin intact, warm to touch.  Bilateral plantar foot wounds are closed measuring less than 1 cm with mild redness, mild induration and no drainage.  Patient denied pain.  Education was provided on proper foot wear for support.

## 2025-04-28 NOTE — BH SOCIAL WORK INITIAL PSYCHOSOCIAL EVALUATION - OTHER PAST PSYCHIATRIC HISTORY (INCLUDE DETAILS REGARDING ONSET, COURSE OF ILLNESS, INPATIENT/OUTPATIENT TREATMENT)
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 Hidalgo at [facility] clinic; adherent with Invega Trinza 546 mg q3mo FORD (last 2/11/25); no known suicidality or violence; no legal issues; no active substance use (CBD gummies 1 yr ago); and no significant PMH who was brought in by his mother due to 1 week of delusions of being poisoned, thought disorganization, psychotic guilt, and mood lability in the context of being due for next FORD dose on 5/6/25."

## 2025-04-28 NOTE — BH INPATIENT PSYCHIATRY PROGRESS NOTE - NSBHFUPINTERVALHXFT_PSY_A_CORE
Patient was seen for disorganization/psychosis. Chart, and medications reviewed. Patient is discussed with nursing team. Vitals signs stable. Remains compliant with medications, no SE reported. No behavioral concerns, no prns for aggression.     On intake interview with primary team today, patient corroborates much of the reported history documented from ED and inpatient assessment notes. 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 Patient states that he doesn't like interacting with too many people at a time and his house feels overcrowded. Patient shares that he got into an argument with his father, after which he went for a several miles long walk to get to the [place] in [place]. Patient states that he was thinking about ending his life and had thoughts of jumping off the pier but self-aborted prior to taking any further action. Patient reports one past SA, stating he attempted to suffocate himself with a plastic bag in 2015. Patient denies current SI, HI, AVH. Denies side effects to medications. Patient contracts to safety on the unit. Patient currently follows with Dr. Hidalgo at [facility], but also reports virtual/telephone visit with psych NP, Rochelle Rodrigues @ [facility]. Patient reports wanting to transition care to [facility], due to proximity to home, and is amenable to going back to Invega sustenna monthly injections.   Patient was seen for disorganization/psychosis. Chart, and medications reviewed. Patient is discussed with nursing team. Vitals signs stable. Remains compliant with medications, no SE reported. No behavioral concerns, no prns for aggression.     On intake interview with primary team today, patient corroborates much of the reported history documented from ED and inpatient assessment notes. 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 Patient states that he doesn't like interacting with too many people at a time and his house feels overcrowded. Patient shares that he got into an argument with his father, after which he went for a several miles long walk to get to the beach in [place]. Patient states that he was thinking about ending his life and had thoughts of jumping off the pier but self-aborted prior to taking any further action. Patient reports one past SA, stating he attempted to suffocate himself with a plastic bag in 2015. Patient denies current SI, HI, AVH. Denies side effects to medications. Patient contracts to safety on the unit.

## 2025-04-28 NOTE — ADVANCED PRACTICE NURSE CONSULT - RECOMMEDATIONS
Case discussed with primary nurse to ensure patient is wearing proper foot wear to offload.  Podiatrist consult for further management as needed.

## 2025-04-28 NOTE — BH SOCIAL WORK INITIAL PSYCHOSOCIAL EVALUATION - NSBHABUSENEGLECTFT_PSY_ALL_CORE
Pt reports his father was emotionally neglectful by minimizing his mental illness and just telling him that his problems are because he is the youngest child and "lazy."

## 2025-04-28 NOTE — BH SOCIAL WORK INITIAL PSYCHOSOCIAL EVALUATION - NSBHHOUSECOMMENTFT_PSY_ALL_CORE
Pt lives with his parents. Also has other family that lives in the home on a different floor - niece, adult niece, sister, nephew and sometimes a brother. Pt reports he feels that he helps his family more than they help him and this makes him resentful, but overall relationship is fair and pt is returning to the residence.

## 2025-04-29 PROCEDURE — 99232 SBSQ HOSP IP/OBS MODERATE 35: CPT | Mod: GC

## 2025-04-29 RX ORDER — LORAZEPAM 4 MG/ML
2 VIAL (ML) INJECTION ONCE
Refills: 0 | Status: DISCONTINUED | OUTPATIENT
Start: 2025-04-29 | End: 2025-05-02

## 2025-04-29 RX ORDER — LORAZEPAM 4 MG/ML
2 VIAL (ML) INJECTION EVERY 6 HOURS
Refills: 0 | Status: DISCONTINUED | OUTPATIENT
Start: 2025-04-29 | End: 2025-05-02

## 2025-04-29 RX ADMIN — Medication 1 APPLICATION(S): at 09:05

## 2025-04-29 RX ADMIN — PALIPERIDONE 3 MILLIGRAM(S): 9 TABLET, EXTENDED RELEASE ORAL at 21:16

## 2025-04-29 NOTE — BH INPATIENT PSYCHIATRY PROGRESS NOTE - NSBHFUPINTERVALHXFT_PSY_A_CORE
Patient was seen for disorganization/psychosis. Chart, and medications reviewed. Patient is discussed with nursing team. Vitals signs stable. Remains compliant with medications, no SE reported. No behavioral concerns, no prns for aggression.     Today on interview, patient is calm and cooperative, but reports feeling like he is doing everything wrong, despite no indication of the fact. Patient shares that he read the [facility] welcome booklet and policies, and fixated on certain rules that he felt like he wasn't following. 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 Patient reports that he often catastrophizes things, and feels like he should have his "shit together as a 29-year-old man." Patient initially started the conversation stating he is unsure of what mental health diagnosis he has, after being labeled with adjustment disorder, bipolar disorder, and schizophrenia in the past; after psychoeducation, patient stated he identifies with schizophrenia. Patient denies current SI, HI, AVH. Denies side effects to medications. Patient contracts to safety on the unit.

## 2025-04-29 NOTE — BH INPATIENT PSYCHIATRY PROGRESS NOTE - NSBHASSESSSUMMFT_PSY_ALL_CORE
Patient is a 29-year-old AA single male. 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 Patient has PPH of Schizophrenia Disorder vs schizoaffective disorder-bipolar type. Multiple prior admissions ([place] [id number] 7/5-7/20/21; [facility] 5/12-5/25/2021; [facility] 2017); follows with Dr. Hiadlgo at [facility]; on Invega Trinza 546 mg q3mo FORD (last 2/11/25). Patient was brought in by his mother due to 1 week of delusions of being poisoned, thought disorganization, psychotic guilt, and mood lability in the context of being due for next FORD dose on 5/6/25. Patient is hospitalized with a primary problem of psychotic decompensation. Patient admitted to  on a 9.13 legal status.     Patient presents with primarily negative sxs of psychosis on [facility] characterized by constricted/flat affect, anhedonia, avolition, and social withdrawal, but reported history also demonstrates worsening paranoia, thought disorganization, and self-reported incident of increased aggression at home. Differential includes decompensated schizophrenia iso poor efficacy of FORD versus schizoaffective disorder with current depressive episode. Patient is adherent to Invega Trinza, last dose 2/11/25, so there is the question of drop in clinical efficacy prior to next scheduled injection. Patient will likely benefit from receiving FORD at shorter, monthly intervals to ensure consistent receptor saturation and greater efficacy of treatment.    PLAN  - [facility]  - No indication for 1:1 CO as pt denies SI or HI  - PRNs: Haldol 5 mg + Ativan 2 mg PO/IM q6h as needed for agitation  - Start paliperidone 3 mg HS for psychosis  - Pt due for Invega Trinza 546 mg on 5/6/25 - Will consider switching to Invega Sustenna monthly FORD for improved efficacy.  -Patient currently follows with Dr. Hidalgo at [facility], but also reports virtual/telephone visit with psych NP, Rochelle Rodrigues @ [facility]. Patient reports wanting to transition care to [facility], due to proximity to home, and is amenable to going back to Invega sustenna monthly injections.    - Medical: Denies PMH or home meds. Sole of R foot with abrasion, no signs of infection - can apply bacitracin per medicine. CK with mild elevation, recommend oral hydration and consider repeating with next labs  >Labs: Admission labs reviewed, no acute findings. U-tox negative.  Labs pending for tomorrow: A1c and Lipid panel, and repeat CK. Hold antipsychotics if QTc >500  >Diet: Regular  >Social: milieu/structured therapy  >Treatment Interventions: Groups and Individual Therapy/CBT, Motivational counseling for substance abuse related issues.   >Dispo: Collateral and dispo planning pending further symptom and medication optimization

## 2025-04-30 PROCEDURE — 99232 SBSQ HOSP IP/OBS MODERATE 35: CPT | Mod: GC

## 2025-04-30 RX ADMIN — PALIPERIDONE 3 MILLIGRAM(S): 9 TABLET, EXTENDED RELEASE ORAL at 20:27

## 2025-04-30 NOTE — BH TREATMENT PLAN - NSTXPSYCHOINTERPR_PSY_ALL_CORE
Psychiatric rehabilitation staff recommends patient engages in individual and group therapy sessions in order for patient to gain psychoeducation, psychotherapy and support over the next seven days. Psychiatric rehabilitation staff will continuously work with patient to build therapeutic rapport.

## 2025-04-30 NOTE — BH INPATIENT PSYCHIATRY PROGRESS NOTE - NSBHFUPINTERVALHXFT_PSY_A_CORE
Patient was seen for disorganization/psychosis. Chart, and medications reviewed. Patient is discussed with nursing team. Vitals signs stable. Remains compliant with medications, no SE reported. No behavioral concerns, no prns for aggression.     Patient continues to be in good behavioral control. 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 Patient ruminates about current and prior setbacks, stating that while people say his is doing well, and able to be gainfully employed with [employer], he states "I'm a walking contradiction" and "I can tell people think I'm a giant baby." Patient reports feeling discouraged by current hospitalization stating "it's hard to cope with these setbacks." Patient endorses paranoia towards family, able to reality test against these thoughts, and acknowledges that such thoughts alert him that he may be decompensating. Patient agrees that his Invega Trinza may not be lasting  the full 3 months, as he reports much better symptom control the initial two months after his scheduled injection. Patient denies current SI, HI, AVH. Denies side effects to medications. Patient contracts to safety on the unit.

## 2025-04-30 NOTE — BH TREATMENT PLAN - NSCMSPTSTRENGTHS_PSY_ALL_CORE
Future/goal oriented/Highly motivated for treatment/Intelligence/Positive attitude/Successful coping history/Supportive family

## 2025-04-30 NOTE — BH INPATIENT PSYCHIATRY PROGRESS NOTE - NSBHATTESTCOMMENTATTENDFT_PSY_A_CORE
Emotionally regulated, in good behavioral control, continue above, plan for conversion back to Sustenna.  Team to look into TSI vs AOPD.
Plan for Sustenna conversion.  Pt visible and engaged in therapeutic milieu.
Pt admitted with worsening mood and psychosis symptoms in context of recent occupational and social stressors.  Will consider switching back to Sustenna given concern for waning efficacy prior to next injection, pt is amenable.  Pt also wanting to complete transfer of outpatient care to [facility], will assist.

## 2025-04-30 NOTE — BH INPATIENT PSYCHIATRY PROGRESS NOTE - NSBHASSESSSUMMFT_PSY_ALL_CORE
Patient is a 29-year-old AA single male. Patient domiciled in private residence with parents and sister in 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 Patient has PPH of Schizophrenia Disorder vs schizoaffective disorder-bipolar type. Multiple prior admissions ([place] [id number] 7/5-7/20/21; [facility] 5/12-5/25/2021; [facility] 2017); follows with Dr. Hidalgo at [facility]; on Invega Trinza 546 mg q3mo FORD (last 2/11/25). Patient was brought in by his mother due to 1 week of delusions of being poisoned, thought disorganization, psychotic guilt, and mood lability in the context of being due for next FORD dose on 5/6/25. Patient is hospitalized with a primary problem of psychotic decompensation. Patient admitted to  on a 9.13 legal status.     Patient presents with primarily negative sxs of psychosis on [facility] characterized by constricted/flat affect, anhedonia, avolition, and social withdrawal, but reported history also demonstrates worsening paranoia, thought disorganization, and self-reported incident of increased aggression at home. Differential includes decompensated schizophrenia iso poor efficacy of FORD versus schizoaffective disorder with current depressive episode. Patient is adherent to Invega Trinza, last dose 2/11/25, so there is the question of drop in clinical efficacy prior to next scheduled injection. Patient will likely benefit from receiving FORD at shorter, monthly intervals to ensure consistent receptor saturation and greater efficacy of treatment.    PLAN  [facility]  - No indication for 1:1 CO as pt denies SI or HI  - PRNs: Haldol 5 mg + Ativan 2 mg PO/IM q6h as needed for agitation  - Start paliperidone 3 mg HS for psychosis  - Pt due for Invega Trinza 546 mg on 5/6/25 - Will consider switching to Invega Sustenna monthly FORD for improved efficacy.  -Patient currently follows with Dr. Hidalgo at [facility], but also reports virtual/telephone visit with psych NP, Rochelle Rodrigues @ [facility]. Patient reports wanting to transition care to [facility], due to proximity to home, and is amenable to going back to Invega sustenna monthly injections.    - Medical: Denies PMH or home meds. Sole of R foot with abrasion, no signs of infection - can apply bacitracin per medicine. CK with mild elevation, recommend oral hydration and consider repeating with next labs  >Labs: Admission labs reviewed, no acute findings. U-tox negative.  Labs pending for tomorrow: A1c and Lipid panel, and repeat CK. Hold antipsychotics if QTc >500  >Diet: Regular  >Social: milieu/structured therapy  >Treatment Interventions: Groups and Individual Therapy/CBT, Motivational counseling for substance abuse related issues.   >Dispo: Collateral and dispo planning pending further symptom and medication optimization

## 2025-04-30 NOTE — BH INPATIENT PSYCHIATRY PROGRESS NOTE - NSBHMSETHTPROC_PSY_A_CORE
Disorganized/Tangential/Perseverative/Thought blocking/Illogical/Impaired reasoning
Disorganized/Tangential/Illogical/Impaired reasoning
Disorganized/Tangential/Impaired reasoning
Disorganized/Tangential

## 2025-05-01 PROCEDURE — 99232 SBSQ HOSP IP/OBS MODERATE 35: CPT

## 2025-05-01 RX ORDER — PALIPERIDONE 9 MG/1
156 TABLET, EXTENDED RELEASE ORAL ONCE
Refills: 0 | Status: COMPLETED | OUTPATIENT
Start: 2025-05-01 | End: 2025-05-01

## 2025-05-01 RX ADMIN — PALIPERIDONE 156 MILLIGRAM(S): 9 TABLET, EXTENDED RELEASE ORAL at 18:28

## 2025-05-01 NOTE — BH INPATIENT PSYCHIATRY PROGRESS NOTE - NSDCCRITERIA_PSY_ALL_CORE
Symptom Stabilization  Optimize medications  CGI<=3  Outpatient services f/u  
Symptom Stabilization  Optimize medications  CGI<=3  Outpatient services f/u  
Symptom Stabilization  Optimize medications  CGI<=3  Outpatient services f/u   FORD due to chronic noncompliance  
When pt is no longer an acute or imminent risk of harm to self or others, and is able to care for self safely, pt may then be discharged.   
Symptom Stabilization  Optimize medications  CGI<=3  Outpatient services f/u

## 2025-05-01 NOTE — BH INPATIENT PSYCHIATRY PROGRESS NOTE - NSBHATTESTBILLING_PSY_A_CORE
58438-Nrtxdptfpl OBS or IP - moderate complexity OR 35-49 mins
32962-Lvxybqavhe OBS or IP - moderate complexity OR 35-49 mins
24357-Rqaopmpeth OBS or IP - moderate complexity OR 35-49 mins
[id number]-Tpvixvkbmt OBS or IP - moderate complexity OR 35-49 mins
[id number]-Jlnjasytgs OBS or IP - moderate complexity OR 35-49 mins

## 2025-05-01 NOTE — BH INPATIENT PSYCHIATRY PROGRESS NOTE - NSTXANXINTERMD_PSY_ALL_CORE
med management; psychotherapy

## 2025-05-01 NOTE — BH INPATIENT PSYCHIATRY PROGRESS NOTE - NSBHFUPINTERVALHXFT_PSY_A_CORE
As per staff report, no overnight events.  Pt reiterates today that he is feeling physically well, and that he was here to work on his mental health.  Pt states he feels better now and is looking to transition out of hospital.

## 2025-05-01 NOTE — BH INPATIENT PSYCHIATRY PROGRESS NOTE - NSBHATTESTTYPEVISIT_PSY_A_CORE
Attending Only
Attending with Resident/Fellow/Student
Attending with Resident/Fellow/Student
JUSTIN without on-site Attending supervision
Attending with Resident/Fellow/Student

## 2025-05-01 NOTE — BH INPATIENT PSYCHIATRY PROGRESS NOTE - CURRENT MEDICATION
MEDICATIONS  (STANDING):  influenza   Vaccine 0.5 milliLiter(s) IntraMuscular once  LORazepam   Injectable 2 milliGRAM(s) IntraMuscular Once  paliperidone ER. 3 milliGRAM(s) Oral at bedtime    MEDICATIONS  (PRN):  acetaminophen     Tablet .. 650 milliGRAM(s) Oral every 6 hours PRN Temp greater or equal to 38C (100.4F), Mild Pain (1 - 3), Moderate Pain (4 - 6)  aluminum hydroxide/magnesium hydroxide/simethicone Suspension 30 milliLiter(s) Oral every 4 hours PRN Dyspepsia  haloperidol     Tablet 5 milliGRAM(s) Oral every 6 hours PRN agitation 2/2 psychosis  haloperidol    Injectable 5 milliGRAM(s) IntraMuscular once PRN Agitation  LORazepam     Tablet 2 milliGRAM(s) Oral every 6 hours PRN agitation 2/2 psychosis  melatonin 3 milliGRAM(s) Oral at bedtime PRN Insomnia  
MEDICATIONS  (STANDING):  influenza   Vaccine 0.5 milliLiter(s) IntraMuscular once  LORazepam   Injectable 2 milliGRAM(s) IntraMuscular Once  paliperidone Injectable, Long Acting 156 milliGRAM(s) IntraMuscular once    MEDICATIONS  (PRN):  acetaminophen     Tablet .. 650 milliGRAM(s) Oral every 6 hours PRN Temp greater or equal to 38C (100.4F), Mild Pain (1 - 3), Moderate Pain (4 - 6)  aluminum hydroxide/magnesium hydroxide/simethicone Suspension 30 milliLiter(s) Oral every 4 hours PRN Dyspepsia  haloperidol     Tablet 5 milliGRAM(s) Oral every 6 hours PRN agitation 2/2 psychosis  haloperidol    Injectable 5 milliGRAM(s) IntraMuscular once PRN Agitation  LORazepam     Tablet 2 milliGRAM(s) Oral every 6 hours PRN agitation 2/2 psychosis  melatonin 3 milliGRAM(s) Oral at bedtime PRN Insomnia  
MEDICATIONS  (STANDING):  bacitracin   Ointment 1 Application(s) Topical every 12 hours  influenza   Vaccine 0.5 milliLiter(s) IntraMuscular once  LORazepam   Injectable 2 milliGRAM(s) IntraMuscular Once  paliperidone ER. 3 milliGRAM(s) Oral at bedtime    MEDICATIONS  (PRN):  acetaminophen     Tablet .. 650 milliGRAM(s) Oral every 6 hours PRN Temp greater or equal to 38C (100.4F), Mild Pain (1 - 3), Moderate Pain (4 - 6)  aluminum hydroxide/magnesium hydroxide/simethicone Suspension 30 milliLiter(s) Oral every 4 hours PRN Dyspepsia  haloperidol     Tablet 5 milliGRAM(s) Oral every 6 hours PRN agitation 2/2 psychosis  haloperidol    Injectable 5 milliGRAM(s) IntraMuscular once PRN Agitation  LORazepam     Tablet 2 milliGRAM(s) Oral every 6 hours PRN agitation 2/2 psychosis  melatonin 3 milliGRAM(s) Oral at bedtime PRN Insomnia  
MEDICATIONS  (STANDING):  bacitracin   Ointment 1 Application(s) Topical every 12 hours  influenza   Vaccine 0.5 milliLiter(s) IntraMuscular once  LORazepam   Injectable 2 milliGRAM(s) IntraMuscular Once  paliperidone ER. 3 milliGRAM(s) Oral at bedtime    MEDICATIONS  (PRN):  acetaminophen     Tablet .. 650 milliGRAM(s) Oral every 6 hours PRN Temp greater or equal to 38C (100.4F), Mild Pain (1 - 3), Moderate Pain (4 - 6)  aluminum hydroxide/magnesium hydroxide/simethicone Suspension 30 milliLiter(s) Oral every 4 hours PRN Dyspepsia  haloperidol     Tablet 5 milliGRAM(s) Oral every 6 hours PRN agitation 2/2 psychosis  haloperidol    Injectable 5 milliGRAM(s) IntraMuscular once PRN Agitation  LORazepam     Tablet 2 milliGRAM(s) Oral every 6 hours PRN agitation 2/2 psychosis  melatonin 3 milliGRAM(s) Oral at bedtime PRN Insomnia  
MEDICATIONS  (STANDING):  influenza   Vaccine 0.5 milliLiter(s) IntraMuscular once  LORazepam   Injectable 2 milliGRAM(s) IntraMuscular Once  paliperidone ER. 3 milliGRAM(s) Oral at bedtime    MEDICATIONS  (PRN):  acetaminophen     Tablet .. 650 milliGRAM(s) Oral every 6 hours PRN Temp greater or equal to 38C (100.4F), Mild Pain (1 - 3), Moderate Pain (4 - 6)  aluminum hydroxide/magnesium hydroxide/simethicone Suspension 30 milliLiter(s) Oral every 4 hours PRN Dyspepsia  haloperidol     Tablet 5 milliGRAM(s) Oral every 6 hours PRN agitation 2/2 psychosis  haloperidol    Injectable 5 milliGRAM(s) IntraMuscular once PRN Agitation  LORazepam     Tablet 2 milliGRAM(s) Oral every 6 hours PRN agitation 2/2 psychosis  melatonin 3 milliGRAM(s) Oral at bedtime PRN Insomnia

## 2025-05-01 NOTE — BH INPATIENT PSYCHIATRY PROGRESS NOTE - NSBHMETABOLIC_PSY_ALL_CORE_FT
Pt not checked in by this RN. PT stated to registration he was leaving ED due to 45 min wait time. Pt observed ambulating from ED with steady gait.   
BMI: BMI (kg/m2): 35 (04-26-25 @ 01:25)  HbA1c: A1C with Estimated Average Glucose Result: 5.3 % (04-27-25 @ 09:30)    Glucose:   BP: --Vital Signs Last 24 Hrs  T(C): 36.7 (05-01-25 @ 08:09), Max: 36.7 (05-01-25 @ 08:09)  T(F): 98 (05-01-25 @ 08:09), Max: 98 (05-01-25 @ 08:09)  HR: --  BP: --  BP(mean): --  RR: --  SpO2: --    Orthostatic VS  05-01-25 @ 08:09  Lying BP: --/-- HR: --  Sitting BP: 121/61 HR: 84  Standing BP: 104/70 HR: 94  Site: --  Mode: --  Orthostatic VS  04-30-25 @ 08:53  Lying BP: --/-- HR: --  Sitting BP: 128/75 HR: 70  Standing BP: 124/69 HR: 82  Site: --  Mode: --  Orthostatic VS  04-29-25 @ 21:00  Lying BP: --/-- HR: --  Sitting BP: 118/83 HR: 89  Standing BP: 105/67 HR: 108  Site: --  Mode: --    Lipid Panel: Date/Time: 04-27-25 @ 09:30  Cholesterol, Serum: 183  LDL Cholesterol Calculated: 96  HDL Cholesterol, Serum: 76  Total Cholesterol/HDL Ration Measurement: --  Triglycerides, Serum: 53  
BMI: BMI (kg/m2): 35 (04-26-25 @ 01:25)  HbA1c:   Glucose:   BP: 130/66 (04-25-25 @ 19:12) (130/66 - 130/66)Vital Signs Last 24 Hrs  T(C): 36.6 (04-27-25 @ 06:28), Max: 36.6 (04-27-25 @ 06:28)  T(F): 97.9 (04-27-25 @ 06:28), Max: 97.9 (04-27-25 @ 06:28)  HR: --  BP: --  BP(mean): --  RR: --  SpO2: --    Orthostatic VS  04-27-25 @ 06:28  Lying BP: --/-- HR: --  Sitting BP: 116/76 HR: 77  Standing BP: 12/68 HR: 93  Site: --  Mode: --  Orthostatic VS  04-26-25 @ 21:59  Lying BP: --/-- HR: --  Sitting BP: 132/76 HR: 79  Standing BP: 127/80 HR: 90  Site: --  Mode: --  Orthostatic VS  04-26-25 @ 08:33  Lying BP: --/-- HR: --  Sitting BP: 135/65 HR: 87  Standing BP: 138/84 HR: 88  Site: --  Mode: --  Orthostatic VS  04-26-25 @ 01:25  Lying BP: --/-- HR: --  Sitting BP: 119/78 HR: 84  Standing BP: 115/80 HR: 94  Site: --  Mode: --    Lipid Panel: 
BMI: BMI (kg/m2): 35 (04-26-25 @ 01:25)  HbA1c: A1C with Estimated Average Glucose Result: 5.3 % (04-27-25 @ 09:30)    Glucose:   BP: --Vital Signs Last 24 Hrs  T(C): 36.8 (04-29-25 @ 08:22), Max: 36.8 (04-28-25 @ 20:43)  T(F): 98.3 (04-29-25 @ 08:22), Max: 98.3 (04-29-25 @ 08:22)  HR: --  BP: --  BP(mean): --  RR: --  SpO2: --    Orthostatic VS  04-29-25 @ 08:22  Lying BP: --/-- HR: --  Sitting BP: 122/67 HR: 71  Standing BP: 118/64 HR: 82  Site: --  Mode: --  Orthostatic VS  04-28-25 @ 20:43  Lying BP: --/-- HR: --  Sitting BP: 115/75 HR: 73  Standing BP: 107/80 HR: 83  Site: --  Mode: --  Orthostatic VS  04-28-25 @ 08:27  Lying BP: --/-- HR: --  Sitting BP: 120/80 HR: 75  Standing BP: 122/84 HR: 100  Site: --  Mode: --  Orthostatic VS  04-27-25 @ 20:41  Lying BP: --/-- HR: --  Sitting BP: 134/83 HR: 103  Standing BP: 132/82 HR: 102  Site: --  Mode: --    Lipid Panel: Date/Time: 04-27-25 @ 09:30  Cholesterol, Serum: 183  LDL Cholesterol Calculated: 96  HDL Cholesterol, Serum: 76  Total Cholesterol/HDL Ration Measurement: --  Triglycerides, Serum: 53  
BMI: BMI (kg/m2): 35 (04-26-25 @ 01:25)  HbA1c: A1C with Estimated Average Glucose Result: 5.3 % (04-27-25 @ 09:30)    Glucose:   BP: --Vital Signs Last 24 Hrs  T(C): 36.8 (04-30-25 @ 08:53), Max: 36.8 (04-30-25 @ 08:53)  T(F): 98.3 (04-30-25 @ 08:53), Max: 98.3 (04-30-25 @ 08:53)  HR: --  BP: --  BP(mean): --  RR: --  SpO2: --    Orthostatic VS  04-30-25 @ 08:53  Lying BP: --/-- HR: --  Sitting BP: 128/75 HR: 70  Standing BP: 124/69 HR: 82  Site: --  Mode: --  Orthostatic VS  04-29-25 @ 21:00  Lying BP: --/-- HR: --  Sitting BP: 118/83 HR: 89  Standing BP: 105/67 HR: 108  Site: --  Mode: --  Orthostatic VS  04-29-25 @ 08:22  Lying BP: --/-- HR: --  Sitting BP: 122/67 HR: 71  Standing BP: 118/64 HR: 82  Site: --  Mode: --  Orthostatic VS  04-28-25 @ 20:43  Lying BP: --/-- HR: --  Sitting BP: 115/75 HR: 73  Standing BP: 107/80 HR: 83  Site: --  Mode: --    Lipid Panel: Date/Time: 04-27-25 @ 09:30  Cholesterol, Serum: 183  LDL Cholesterol Calculated: 96  HDL Cholesterol, Serum: 76  Total Cholesterol/HDL Ration Measurement: --  Triglycerides, Serum: 53  
BMI: BMI (kg/m2): 35 (04-26-25 @ 01:25)  HbA1c: A1C with Estimated Average Glucose Result: 5.3 % (04-27-25 @ 09:30)    Glucose:   BP: 130/66 (04-25-25 @ 19:12) (130/66 - 130/66)Vital Signs Last 24 Hrs  T(C): 36.5 (04-28-25 @ 08:27), Max: 36.5 (04-28-25 @ 08:27)  T(F): 97.7 (04-28-25 @ 08:27), Max: 97.7 (04-28-25 @ 08:27)  HR: --  BP: --  BP(mean): --  RR: --  SpO2: --    Orthostatic VS  04-28-25 @ 08:27  Lying BP: --/-- HR: --  Sitting BP: 120/80 HR: 75  Standing BP: 122/84 HR: 100  Site: --  Mode: --  Orthostatic VS  04-27-25 @ 20:41  Lying BP: --/-- HR: --  Sitting BP: 134/83 HR: 103  Standing BP: 132/82 HR: 102  Site: --  Mode: --  Orthostatic VS  04-27-25 @ 06:28  Lying BP: --/-- HR: --  Sitting BP: 116/76 HR: 77  Standing BP: 12/68 HR: 93  Site: --  Mode: --  Orthostatic VS  04-26-25 @ 21:59  Lying BP: --/-- HR: --  Sitting BP: 132/76 HR: 79  Standing BP: 127/80 HR: 90  Site: --  Mode: --    Lipid Panel: Date/Time: 04-27-25 @ 09:30  Cholesterol, Serum: 183  LDL Cholesterol Calculated: 96  HDL Cholesterol, Serum: 76  Total Cholesterol/HDL Ration Measurement: --  Triglycerides, Serum: 53

## 2025-05-01 NOTE — BH INPATIENT PSYCHIATRY PROGRESS NOTE - NSBHASSESSSUMMFT_PSY_ALL_CORE
Patient is a 29-year-old AA single male. 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 Patient has PPH of Schizophrenia Disorder vs schizoaffective disorder-bipolar type. Multiple prior admissions ([place] [id number] 7/5-7/20/21; [facility] 5/12-5/25/2021; [facility] 2017); follows with Dr. Hidalgo at [facility]; on Invega Trinza 546 mg q3mo FORD (last 2/11/25). Patient was brought in by his mother due to 1 week of delusions of being poisoned, thought disorganization, psychotic guilt, and mood lability in the context of being due for next FORD dose on 5/6/25. Patient is hospitalized with a primary problem of psychotic decompensation. Patient admitted to  on a 9.13 legal status.  Exacerbation likely occurring in context of waning efficiency of Trinza prior to next scheduled dose.    Attenuating psychosis.    PLAN  - [facility]  - No indication for 1:1 CO as pt denies SI or HI  - PRNs: Haldol 5 mg + Ativan 2 mg PO/IM q6h as needed for agitation  - Will convert to Sustenna today, 156 mg IM (pt was taking Trinza 546 mg IM).  Will discontinue oral paliperidone.    -Patient currently follows with Dr. Hidalgo at [facility], but also reports virtual/telephone visit with psych NP, Rochelle Rodrigues @ [facility]. Patient reports wanting to transition care to [facility], due to proximity to home, and is amenable to going back to Invega sustenna monthly injections.    - Medical: Denies PMH or home meds. Sole of R foot with abrasion, no signs of infection - can apply bacitracin per medicine. CK with mild elevation, recommend oral hydration and consider repeating with next labs  >Labs: Admission labs reviewed, no acute findings. U-tox negative.  Labs pending for tomorrow: A1c and Lipid panel, and repeat CK. Hold antipsychotics if QTc >500  >Diet: Regular  >Social: milieu/structured therapy  >Treatment Interventions: Groups and Individual Therapy/CBT, Motivational counseling for substance abuse related issues.   >Dispo: Collateral and dispo planning pending further symptom and medication optimization

## 2025-05-01 NOTE — BH INPATIENT PSYCHIATRY PROGRESS NOTE - MSE UNSTRUCTURED FT
Appearance: Dressed appropriately.  Behavior: Cooperative.    Motor: No abnormal movements, no psychomotor slowing or activation.  Speech: Regular rate.  Mood: "Ok."  Affect: Neutral.  Thought Process: Linear.  Associations: Fair.  Thought Content: Denies AVH.  Denies SIIP or HIIP.  Insight: Limited.  Judgment: Fair on interview.  Attention: Fair.  Language: Fluent.  Gait: Intact.

## 2025-05-01 NOTE — BH INPATIENT PSYCHIATRY PROGRESS NOTE - PRN MEDS
MEDICATIONS  (PRN):  acetaminophen     Tablet .. 650 milliGRAM(s) Oral every 6 hours PRN Temp greater or equal to 38C (100.4F), Mild Pain (1 - 3), Moderate Pain (4 - 6)  aluminum hydroxide/magnesium hydroxide/simethicone Suspension 30 milliLiter(s) Oral every 4 hours PRN Dyspepsia  haloperidol     Tablet 5 milliGRAM(s) Oral every 6 hours PRN agitation 2/2 psychosis  haloperidol    Injectable 5 milliGRAM(s) IntraMuscular once PRN Agitation  LORazepam     Tablet 2 milliGRAM(s) Oral every 6 hours PRN agitation 2/2 psychosis  melatonin 3 milliGRAM(s) Oral at bedtime PRN Insomnia  

## 2025-05-01 NOTE — BH INPATIENT PSYCHIATRY PROGRESS NOTE - NSBHCHARTREVIEWVS_PSY_A_CORE FT
Vital Signs Last 24 Hrs  T(C): 36.5 (04-28-25 @ 08:27), Max: 36.5 (04-28-25 @ 08:27)  T(F): 97.7 (04-28-25 @ 08:27), Max: 97.7 (04-28-25 @ 08:27)  HR: --  BP: --  BP(mean): --  RR: --  SpO2: --    Orthostatic VS  04-28-25 @ 08:27  Lying BP: --/-- HR: --  Sitting BP: 120/80 HR: 75  Standing BP: 122/84 HR: 100  Site: --  Mode: --  Orthostatic VS  04-27-25 @ 20:41  Lying BP: --/-- HR: --  Sitting BP: 134/83 HR: 103  Standing BP: 132/82 HR: 102  Site: --  Mode: --  Orthostatic VS  04-27-25 @ 06:28  Lying BP: --/-- HR: --  Sitting BP: 116/76 HR: 77  Standing BP: 12/68 HR: 93  Site: --  Mode: --  Orthostatic VS  04-26-25 @ 21:59  Lying BP: --/-- HR: --  Sitting BP: 132/76 HR: 79  Standing BP: 127/80 HR: 90  Site: --  Mode: --  
Vital Signs Last 24 Hrs  T(C): 36.7 (05-01-25 @ 08:09), Max: 36.7 (05-01-25 @ 08:09)  T(F): 98 (05-01-25 @ 08:09), Max: 98 (05-01-25 @ 08:09)  HR: --  BP: --  BP(mean): --  RR: --  SpO2: --    Orthostatic VS  05-01-25 @ 08:09  Lying BP: --/-- HR: --  Sitting BP: 121/61 HR: 84  Standing BP: 104/70 HR: 94  Site: --  Mode: --  Orthostatic VS  04-30-25 @ 08:53  Lying BP: --/-- HR: --  Sitting BP: 128/75 HR: 70  Standing BP: 124/69 HR: 82  Site: --  Mode: --  Orthostatic VS  04-29-25 @ 21:00  Lying BP: --/-- HR: --  Sitting BP: 118/83 HR: 89  Standing BP: 105/67 HR: 108  Site: --  Mode: --  
Vital Signs Last 24 Hrs  T(C): 36.6 (04-27-25 @ 06:28), Max: 36.6 (04-27-25 @ 06:28)  T(F): 97.9 (04-27-25 @ 06:28), Max: 97.9 (04-27-25 @ 06:28)  HR: --  BP: --  BP(mean): --  RR: --  SpO2: --    Orthostatic VS  04-27-25 @ 06:28  Lying BP: --/-- HR: --  Sitting BP: 116/76 HR: 77  Standing BP: 12/68 HR: 93  Site: --  Mode: --  Orthostatic VS  04-26-25 @ 21:59  Lying BP: --/-- HR: --  Sitting BP: 132/76 HR: 79  Standing BP: 127/80 HR: 90  Site: --  Mode: --  Orthostatic VS  04-26-25 @ 08:33  Lying BP: --/-- HR: --  Sitting BP: 135/65 HR: 87  Standing BP: 138/84 HR: 88  Site: --  Mode: --  Orthostatic VS  04-26-25 @ 01:25  Lying BP: --/-- HR: --  Sitting BP: 119/78 HR: 84  Standing BP: 115/80 HR: 94  Site: --  Mode: --  
Vital Signs Last 24 Hrs  T(C): 36.8 (04-30-25 @ 08:53), Max: 36.8 (04-30-25 @ 08:53)  T(F): 98.3 (04-30-25 @ 08:53), Max: 98.3 (04-30-25 @ 08:53)  HR: --  BP: --  BP(mean): --  RR: --  SpO2: --    Orthostatic VS  04-30-25 @ 08:53  Lying BP: --/-- HR: --  Sitting BP: 128/75 HR: 70  Standing BP: 124/69 HR: 82  Site: --  Mode: --  Orthostatic VS  04-29-25 @ 21:00  Lying BP: --/-- HR: --  Sitting BP: 118/83 HR: 89  Standing BP: 105/67 HR: 108  Site: --  Mode: --  Orthostatic VS  04-29-25 @ 08:22  Lying BP: --/-- HR: --  Sitting BP: 122/67 HR: 71  Standing BP: 118/64 HR: 82  Site: --  Mode: --  Orthostatic VS  04-28-25 @ 20:43  Lying BP: --/-- HR: --  Sitting BP: 115/75 HR: 73  Standing BP: 107/80 HR: 83  Site: --  Mode: --  
Vital Signs Last 24 Hrs  T(C): 36.8 (04-29-25 @ 08:22), Max: 36.8 (04-28-25 @ 20:43)  T(F): 98.3 (04-29-25 @ 08:22), Max: 98.3 (04-29-25 @ 08:22)  HR: --  BP: --  BP(mean): --  RR: --  SpO2: --    Orthostatic VS  04-29-25 @ 08:22  Lying BP: --/-- HR: --  Sitting BP: 122/67 HR: 71  Standing BP: 118/64 HR: 82  Site: --  Mode: --  Orthostatic VS  04-28-25 @ 20:43  Lying BP: --/-- HR: --  Sitting BP: 115/75 HR: 73  Standing BP: 107/80 HR: 83  Site: --  Mode: --  Orthostatic VS  04-28-25 @ 08:27  Lying BP: --/-- HR: --  Sitting BP: 120/80 HR: 75  Standing BP: 122/84 HR: 100  Site: --  Mode: --  Orthostatic VS  04-27-25 @ 20:41  Lying BP: --/-- HR: --  Sitting BP: 134/83 HR: 103  Standing BP: 132/82 HR: 102  Site: --  Mode: --

## 2025-05-01 NOTE — BH INPATIENT PSYCHIATRY PROGRESS NOTE - NSTXPSYCHOGOAL_PSY_ALL_CORE
Will be able to report experiencing hallucinations to staff
Will verbalize 1 strategy to successfully cope with psychotic symptoms
Will be able to report experiencing hallucinations to staff

## 2025-05-02 VITALS — TEMPERATURE: 97 F

## 2025-05-02 PROCEDURE — 99238 HOSP IP/OBS DSCHRG MGMT 30/<: CPT

## 2025-05-02 RX ORDER — PALIPERIDONE 9 MG/1
156 TABLET, EXTENDED RELEASE ORAL
Qty: 0 | Refills: 0 | DISCHARGE

## 2025-05-02 NOTE — BH INPATIENT PSYCHIATRY DISCHARGE NOTE - NSBHDCHANDOFFFT_PSY_ALL_CORE
Clinical handoff including hospital course, diagnosis, and treatment was sent to: Rochelle Rodrigues NP at [facility] [phone].

## 2025-05-02 NOTE — BH DISCHARGE NOTE NURSING/SOCIAL WORK/PSYCH REHAB - NSCDUDCCRISIS_PSY_A_CORE
[facility]  [phone] [facility] ([phone])  Text "WELL" to [phone]  Website: www.[employer]/.[employer] 1 [phone] 621-KTMK ([phone]) Website: www.safehorizon.org/.National Suicide Prevention Lifeline 5 (897) 844-4852/.  [employer]  1 (683) LIFENET (176-1481)/.  [facility]  [phone] [place] [phone]   Hours:  Monday through Friday from 9 AM to 3 PM/988 Suicide and Crisis Lifeline

## 2025-05-02 NOTE — BH PSYCHOLOGY - GROUP THERAPY NOTE - NSPSYCHOLGRPCOGPT_PSY_A_CORE FT
Patient attended Cognitive Behavioral Therapy Group incorporating ACT-based concepts. The group began with a brief check-in asking patients to share something they want to do more of. Group then engaged in a progressive muscle relaxation exercise and processed the mindfulness practice afterwards. Group member posed question about whether it is possible to overdo mindfulness practices and group engaged in discussion sharing their thoughts. Group was introduced to valued components handout and engaged in discussion about identifying valued domains and why it is helpful to do so. Group discussed differences in their top values. Group facilitators explained concepts, reinforced participation, and engaged patients in the discussion. 
Patient attended Cognitive Behavioral Therapy Group incorporating ACT-based concepts. The group began with a brief check-in asking patients to share how someone close to them would describe them. Group then engaged in a “leaves on a stream” mindfulness practice. Group discussed the concept of mindfulness of thought, emphasizing the importance of observing thoughts without judgment, allowing them to come and go naturally rather than engaging with or pushing them away. The “leaves on a stream” technique was used to illustrate how thoughts can be noticed and released without attachment. Discussion also included strategies for reducing rumination and increasing psychological flexibility by letting thoughts run their course instead of trying to control or suppress them. Group facilitators explained concepts, reinforced participation, and engaged patients in the discussion.

## 2025-05-02 NOTE — BH DISCHARGE NOTE NURSING/SOCIAL WORK/PSYCH REHAB - NSDCPRGOAL_PSY_ALL_CORE
Over the course of the current hospitalization, Psychiatric Rehabilitation Staff and patient discussed level of functioning, addressed concerns surrounding the hospitalization, discharge, engaged in skill development and safety planning. Patient is leaving on 3 day letter. Patient made progress towards the goal of being able to report hallucinations to staff. Patient showed this by starting to be aware that he is experiencing hallucinations. During Psychiatric Hospitalization patient was found mostly in day room, appropriately dressed/groomed and appeared calm and tired. The patient attended minimal Psychiatric groups. Patient was minimally engaged in psychiatric rehabilitation groups. Patient completed a safety plan upon discharge. Patient was given a copy of safety plan prior to discharge.

## 2025-05-02 NOTE — BH INPATIENT PSYCHIATRY DISCHARGE NOTE - NSBHFUPINTERVALHXFT_PSY_A_CORE
No overnight events.  Pt reports his moods are good, feels much better now than when he first arrived to ER.  He denies hearing any voices, he also denies any thoughts of harming self or others.  Pt is looking forward to discharge, states he wants to find a new job.

## 2025-05-02 NOTE — BH INPATIENT PSYCHIATRY DISCHARGE NOTE - NSDCCPCAREPLAN_GEN_ALL_CORE_FT
PRINCIPAL DISCHARGE DIAGNOSIS  Diagnosis: Schizophrenia  Assessment and Plan of Treatment: Medication management and psychotherapy

## 2025-05-02 NOTE — BH INPATIENT PSYCHIATRY DISCHARGE NOTE - NSBHASSESSSUMMFT_PSY_ALL_CORE
Attenuated presentation.  Pt is future oriented.  Pt denies any SIIP or HIIP.  Pt submitted 3 day letter, plan for discharge home today.    Risk assessment on discharge: Pt has chronic risk factors of schizophrenia, past psychiatric hospitalizations, hx of substance use, unemployment, with acute risk factors of recent psychosis, now treated with inpatient care consisting of medication management and psychotherapeutic interventions.  Protective factors of supportive family, stable housing, future orientation, therapeutic alliances.  Pt submitted 3 day letter.   Pt has consistently denied suicidality and homicidality, is emotionally regulated and in good behavioral control, and is caring for ADLs independently.  While pt is chronic risk of harm, pt is NOT an acute or imminent risk of harm to self or others.  Therefore treatment team has NO clinical indication to seek court order for involuntary retention.  Pt will be discharged on 3 day letter.     1. Will d/c home on current regimen.  2. Psychoeducation provided regarding importance of compliance with outpatient appointments and medications.  Team confirmed that pt is enrolled at [facility] and has appointment this coming Monday.  Notified [facility] that pt will be receiving aftercare at [facility].  3. Pt was advised to remain abstinent with substances.  4. Pt was advised to dial 911 or return to ER if they become danger to self or others.

## 2025-05-02 NOTE — BH DISCHARGE NOTE NURSING/SOCIAL WORK/PSYCH REHAB - DISCHARGE INSTRUCTIONS AFTERCARE APPOINTMENTS
In order to check the location, date, or time of your aftercare appointment, please refer to your Discharge Instructions Document given to you upon leaving the hospital.  If you have lost the instructions please call [phone]

## 2025-05-02 NOTE — BH PSYCHOLOGY - GROUP THERAPY NOTE - TOKEN PULL-DIAGNOSIS
Primary Diagnosis:  Schizophrenia [F20.9]        Problem Dx:   Schizophrenia, unspecified type [F20.9]      
Primary Diagnosis:  Schizophrenia [F20.9]        Problem Dx:   Schizophrenia, unspecified type [F20.9]

## 2025-05-02 NOTE — BH INPATIENT PSYCHIATRY DISCHARGE NOTE - NSBHMETABOLIC_PSY_ALL_CORE_FT
BMI: BMI (kg/m2): 35 (04-26-25 @ 01:25)  HbA1c: A1C with Estimated Average Glucose Result: 5.3 % (04-27-25 @ 09:30)    Glucose:   BP: --Vital Signs Last 24 Hrs  T(C): 36.3 (05-02-25 @ 08:17), Max: 36.3 (05-02-25 @ 08:17)  T(F): 97.3 (05-02-25 @ 08:17), Max: 97.3 (05-02-25 @ 08:17)  HR: --  BP: --  BP(mean): --  RR: --  SpO2: --    Orthostatic VS  05-02-25 @ 08:17  Lying BP: --/-- HR: --  Sitting BP: 113/72 HR: 91  Standing BP: 94/67 HR: 108  Site: --  Mode: --  Orthostatic VS  05-01-25 @ 08:09  Lying BP: --/-- HR: --  Sitting BP: 121/61 HR: 84  Standing BP: 104/70 HR: 94  Site: --  Mode: --    Lipid Panel: Date/Time: 04-27-25 @ 09:30  Cholesterol, Serum: 183  LDL Cholesterol Calculated: 96  HDL Cholesterol, Serum: 76  Total Cholesterol/HDL Ration Measurement: --  Triglycerides, Serum: 53

## 2025-05-02 NOTE — BH INPATIENT PSYCHIATRY DISCHARGE NOTE - NSICDXPASTSURGICALHX_GEN_ALL_CORE_FT
PAST SURGICAL HISTORY:  No significant past surgical history      I will SWITCH the dose or number of times a day I take the medications listed below when I get home from the hospital:  None

## 2025-05-02 NOTE — BH INPATIENT PSYCHIATRY DISCHARGE NOTE - OTHER PAST PSYCHIATRIC HISTORY (INCLUDE DETAILS REGARDING ONSET, COURSE OF ILLNESS, INPATIENT/OUTPATIENT TREATMENT)
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 Hidalgo at [facility] clinic; adherent with Invega Trinza 546 mg q3mo FORD (last 2/11/25); no known suicidality or violence; no legal issues; no active substance use (CBD gummies 1 yr ago); and no significant PMH who was brought in by his mother due to 1 week of delusions of being poisoned, thought disorganization, psychotic guilt, and mood lability in the context of being due for next FORD dose on 5/6/25." Past diagnosis of schizophrenia.  Multiple prior admissions ([place] [id number] 7/5-7/20/21; [facility] 5/12-5/25/2021; [facility] 2017).  Transferring care from Dr. Hidalgo at [facility] [place].  Invega Trinza 546 mg q3mo FORD - last given 2/11/2025, next due on or around 5/6/25.

## 2025-05-02 NOTE — BH PSYCHOLOGY - GROUP THERAPY NOTE - NSPSYCHOLGRPCOGINT_PSY_A_CORE
Pt sleeping in bed. Equal chest rise and fall. Security sitter present   group members provided support/group members suggested positive behaviors/mindfulness skills taught/relaxation skills practiced/other..

## 2025-05-02 NOTE — BH PSYCHOLOGY - GROUP THERAPY NOTE - NSBHPSYCHOLRESPCOMMENT_PSY_A_CORE FT
The patient appeared adequately groomed and casually dressed. Pt appeared engaged in group as evidenced by his willingness to verbally communicate during the discussion. Pt posed question to group about meditating “too much” and described concerns about routine becoming obsessive. Pt was oriented X3. Pt was appropriate with peers. 
The patient appeared adequately groomed and casually dressed. Pt appeared engaged in group as evidenced by his willingness to verbally communicate during the discussion. Pt defined ruminations to the group. Pt was oriented X3. Pt was appropriate with peers.

## 2025-05-02 NOTE — BH DISCHARGE NOTE NURSING/SOCIAL WORK/PSYCH REHAB - PATIENT PORTAL LINK FT
You can access the FollowMyHealth Patient Portal offered by [facility] by registering at the following website: [email or web address]. By joining [employer]’s FollowMyHealth portal, you will also be able to view your health information using other applications (apps) compatible with our system.

## 2025-05-02 NOTE — BH INPATIENT PSYCHIATRY DISCHARGE NOTE - DESCRIPTION
Lives w/ parents in [place]. 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

## 2025-05-02 NOTE — BH INPATIENT PSYCHIATRY DISCHARGE NOTE - NSBHSATHC_PSY_A_CORE FT
H/o CBD edibles, reports last in 2023, UTOX neg H/o CBD edibles, reports last in 2023, urine toxicology negative.

## 2025-05-02 NOTE — BH DISCHARGE NOTE NURSING/SOCIAL WORK/PSYCH REHAB - FINANCIAL ASSISTANCE
[facility] provides services at a reduced cost to those who are determined to be eligible through [facility]’s financial assistance program. Information regarding [facility]’s financial assistance program can be found by going to [email or web address] or by calling [phone].

## 2025-05-02 NOTE — BH INPATIENT PSYCHIATRY DISCHARGE NOTE - HOSPITAL COURSE
Pt was admitted to [facility] on 4/25/2025 with psychosis.  Etiology of presentation was likely exacerbation of historical diagnosis of schizophrenia.  Collateral from outpatient team indicated possible waning effect of FORD right before next dose was due.  Team discussed options of converting Trinza back to Sustenna vs switching FORD to alternative, pt opted for the former.  Pt was given Invega Sustenna 156 mg IM on 5/1/2025, next dose due on or around 6/1/2025.  Oral paliperidone that had been temporarily ordered by ER was discontinued.  Pt throughout admission became more euthymic in affect, more linear in thought process and goal directed in behavior.  Pt was visible on unit, social with peers, attended groups, was compliant with medications and basic care, and maintained ADLs independently.  Pt was future oriented, and pt consistently denied any suicidality or homicidality.  Treatment team confirmed with [facility] that he had completed intake and had an assigned prescriber.  As pt wanted to continue with [facility], team notified [facility] of completed transfer of outpatient care.  On 5/1/2025, pt submitted 3 day letter.  Given that pt was no longer an acute or imminent risk of harm to self or others, treatment team did not have clinical indication to seek court order for involuntary retention.  Pt was discharged home.

## 2025-05-02 NOTE — BH INPATIENT PSYCHIATRY DISCHARGE NOTE - HPI (INCLUDE ILLNESS QUALITY, SEVERITY, DURATION, TIMING, CONTEXT, MODIFYING FACTORS, ASSOCIATED SIGNS AND SYMPTOMS)
HD #2  Dx--incarcerated hernia  Low grade temp  vss  Tolerating liqs  Abd--soft, incisional tenderness, dressing dry    I/P  S/p repair incarcerated hernia  Advance to solid diet/ d/c if tolerated     Patient was seen and evaluated, chart, medications and labs reviewed. Case discussed with nursing team.  On service for this 29-year-old AA single male. 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 Patient has PPH of Schizophrenia Disorder vs schizoaffective disorder-bipolar type. Multiple prior admissions ([place] [id number] -21; [facility] -2021; [facility] ); follows with Dr. Hidalgo at [facility]; on Invega Trinza 546 mg q3mo FORD (last 25). Patient was brought in by his mother due to 1 week of delusions of being poisoned, thought disorganization, psychotic guilt, and mood lability in the context of being due for next FORD dose on 25. Patient is hospitalized with a primary problem of psychotic decompensation.  Patient admitted to  on a 9.13 legal status. I have reviewed the initial psychiatric assessment in the electronic medical record, including the history of present illness, past psychiatric history, family/social history (no pertinent changes), and exam, and have confirmed the salient findings dated 25.  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 Hidalgo at [facility]; adherent with Invega Trinza 546 mg q3mo FORD (last 25); no known suicidality or violence; no legal issues; no active substance use (CBD gummies 1 yr ago); and no significant PMH who was brought in by his mother due to 1 week of delusions of being poisoned, thought disorganization, psychotic guilt, and mood lability in the context of being due for next FORD dose on 25. In the ED, patient is calm but interview is limited by thought disorder. 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 He reports feeling "childish" because he plays video games, asks his mom to help with his meals, and is not working. He repeatedly makes statements like "I'm such as idiot" and "I should have known that."  He also believes that he has been getting sick in multiple ways, primarily by being poisoned by something in his home and developing gangrene in his mouth and lips. He endorses delusions that a gas leak, poison in his water, or household cleaning sprays could be the cause of his gangrene. Patient also believes he has a seizure disorder because he feels weak at times and his mother has seizures. He believes that his saliva could poison others, so he offers to wear a mask during the interview. Pt states he has not been eating or drinking much as he is not sure where the poison is coming from. He also endorses paranoia that his mother is talking about him to her friend Katerine, who he suspects works at [employer] (pt met RN named Katerine in  area). He denies any fear that others will harm him. He does report passing thought in the ambulance today that he should rape someone in order to be punished severely, as he believes he is a bad person. He denies having any specific person in mind to rape/assault and reports no intent/plan to act on this thought. He endorses AH of music notes but denies any CAH or voices.  Pt denies VH, but does make bizarre statements such as that he saw a woman giving birth in the ED before being brought to .  He states, "I don't know why I'd be here as a man in a woman's hospital, but it's okay because I need help." Reports poor sleep due to "making music" and having conversations with friends. Pt states he has been going on walks daily up to 50 miles/day to get space from his family. He endorses hopelessness and passive SI, but denies active SIIP, SIB, or suicide attempts. On ROS, pt denies elevated/irritable mood, increased energy/activity, grandiosity, substance use, h/o complicated withdrawal.    On unit: information came from chart review and patient interview:  In today’s interview patient reported his mood to be “I’m upset because I cant do the things I need and I feel I am a failure”  Patient reports continued mood dysregulation, anxiety. He currently denies SI/SIB/HI, but reports recent passive SI for several weeks, no plan or intent on unit and engages in safety planning.   Patient presents bizarre, paranoid guarded and delusional. Patient believes that he has been poisoned by something in his home and has gangrene in his mouth and lips. Endorses delusions of a gas leak, and poison in his water. He believes that his saliva could poison others.  He endorses AH of music notes but denies any CAH. Reports he walked for 12 hours ([place] 621) “I walked all the borough[place] I wanted to sink into the ocean” when pt is asked why he replies “I was living a disgusting life” stated that his feet hurt from walking, shows NP his right plantar, has callous, reports that it is painful.  He denies history of aggression or violence. No access to firearm. Patient denies any symptoms suggestive of active jasmin: (denied grandiosity/ racing thoughts/ increased goal directed activities or engaged in risk taking behavior/ no pressured speech/ no elevated mood/ denied any increased in energy level causing sleep disruption), no signs of catatonia (with no signs of mutism, negativism, stereotypy, echolalia, echopraxia, posturing or rigidity. He was alert and able to answer appropriately to questions during exam. denies obsessive, intrusive and persistent thoughts or compulsive, ritualistic acts are reported. Patient denies any active legal issues, is not currently under any kind of court supervision.  Denies substance use, h/o CBD edibles, reports last in , admitting u-tox negative.  No tobacco/vaping.  Denies any other non-substance addictive behaviors (sex addiction, gambling addiction, overspending/oniomania, compulsive overeating).   No PMH. 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 Hidalgo at [facility] clinic; adherent with Invega Trinza 546 mg q3mo FORD (last 2/11/25); no known suicidality or violence; no legal issues; no active substance use (CBD gummies 1 yr ago); and no significant PMH who was brought in by his mother due to 1 week of delusions of being poisoned, thought disorganization, psychotic guilt, and mood lability in the context of being due for next FORD dose on 5/6/25.  In the ED, patient is calm but interview is limited by thought disorder. 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 He reports feeling "childish" because he plays video games, asks his mom to help with his meals, and is not working. He repeatedly makes statements like "I'm such as idiot" and "I should have known that."  He also believes that he has been getting sick in multiple ways, primarily by being poisoned by something in his home and developing gangrene in his mouth and lips. He endorses delusions that a gas leak, poison in his water, or household cleaning sprays could be the cause of his gangrene. Patient also believes he has a seizure disorder because he feels weak at times and his mother has seizures. He believes that his saliva could poison others, so he offers to wear a mask during the interview. Pt states he has not been eating or drinking much as he is not sure where the poison is coming from.  He also endorses paranoia that his mother is talking about him to her friend Katerine, who he suspects works at [employer] (pt met RN named Katerine in  area). He denies any fear that others will harm him. He does report passing thought in the ambulance today that he should rape someone in order to be punished severely, as he believes he is a bad person. He denies having any specific person in mind to rape/assault and reports no intent/plan to act on this thought. He endorses AH of music notes but denies any CAH or voices.  Pt denies VH, but does make bizarre statements such as that he saw a woman giving birth in the ED before being brought to .  He states, "I don't know why I'd be here as a man in a woman's hospital, but it's okay because I need help." Reports poor sleep due to "making music" and having conversations with friends. Pt states he has been going on walks daily up to 50 miles/day to get space from his family. He endorses hopelessness and passive SI, but denies active SIIP, SIB, or suicide attempts.  On ROS, pt denies elevated/irritable mood, increased energy/activity, grandiosity, substance use, h/o complicated withdrawal."

## 2025-06-06 PROCEDURE — 99214 OFFICE O/P EST MOD 30 MIN: CPT | Mod: 95

## 2025-06-06 PROCEDURE — 90833 PSYTX W PT W E/M 30 MIN: CPT | Mod: 95

## 2025-06-09 PROCEDURE — 90853 GROUP PSYCHOTHERAPY: CPT | Mod: 95

## 2025-06-23 PROCEDURE — 90853 GROUP PSYCHOTHERAPY: CPT | Mod: 95

## 2025-06-27 PROCEDURE — 99214 OFFICE O/P EST MOD 30 MIN: CPT | Mod: 95

## 2025-07-07 PROCEDURE — 90853 GROUP PSYCHOTHERAPY: CPT | Mod: 95

## 2025-07-14 PROCEDURE — 90853 GROUP PSYCHOTHERAPY: CPT | Mod: 95

## 2025-07-21 PROCEDURE — 90853 GROUP PSYCHOTHERAPY: CPT | Mod: 95

## 2025-07-25 PROCEDURE — 99214 OFFICE O/P EST MOD 30 MIN: CPT | Mod: 95

## 2025-08-11 PROCEDURE — 90853 GROUP PSYCHOTHERAPY: CPT | Mod: 95

## 2025-08-14 PROCEDURE — 99214 OFFICE O/P EST MOD 30 MIN: CPT | Mod: 95

## 2025-08-25 PROCEDURE — 90853 GROUP PSYCHOTHERAPY: CPT | Mod: 95
